# Patient Record
Sex: FEMALE | Race: WHITE | NOT HISPANIC OR LATINO | Employment: OTHER | URBAN - METROPOLITAN AREA
[De-identification: names, ages, dates, MRNs, and addresses within clinical notes are randomized per-mention and may not be internally consistent; named-entity substitution may affect disease eponyms.]

---

## 2023-03-24 ENCOUNTER — TELEPHONE (OUTPATIENT)
Dept: PRIMARY CARE | Facility: CLINIC | Age: 68
End: 2023-03-24
Payer: MEDICARE

## 2023-03-24 DIAGNOSIS — F32.A DEPRESSION, UNSPECIFIED DEPRESSION TYPE: ICD-10-CM

## 2023-03-24 NOTE — TELEPHONE ENCOUNTER
Refill lexapro  [facility]  Pt is out of med  Please send today

## 2023-03-27 PROBLEM — F32.A DEPRESSION: Status: ACTIVE | Noted: 2023-03-27

## 2023-03-27 RX ORDER — ESCITALOPRAM OXALATE 20 MG/1
1 TABLET ORAL DAILY
COMMUNITY
Start: 2021-02-12 | End: 2023-03-27 | Stop reason: SDUPTHER

## 2023-03-27 RX ORDER — ESCITALOPRAM OXALATE 20 MG/1
20 TABLET ORAL DAILY
Qty: 90 TABLET | Refills: 1 | Status: SHIPPED | OUTPATIENT
Start: 2023-03-27 | End: 2023-08-11 | Stop reason: SDUPTHER

## 2023-06-28 ENCOUNTER — TELEPHONE (OUTPATIENT)
Dept: PRIMARY CARE | Facility: CLINIC | Age: 68
End: 2023-06-28
Payer: MEDICARE

## 2023-06-28 DIAGNOSIS — J44.9 CHRONIC OBSTRUCTIVE PULMONARY DISEASE, UNSPECIFIED COPD TYPE (MULTI): ICD-10-CM

## 2023-06-29 RX ORDER — ALBUTEROL SULFATE 90 UG/1
AEROSOL, METERED RESPIRATORY (INHALATION)
COMMUNITY
Start: 2023-03-10 | End: 2023-06-29 | Stop reason: SDUPTHER

## 2023-06-29 RX ORDER — ALBUTEROL SULFATE 90 UG/1
AEROSOL, METERED RESPIRATORY (INHALATION)
Qty: 6.7 G | Refills: 0 | Status: SHIPPED | OUTPATIENT
Start: 2023-06-29 | End: 2023-08-11 | Stop reason: SDUPTHER

## 2023-08-10 ENCOUNTER — TELEPHONE (OUTPATIENT)
Dept: PRIMARY CARE | Facility: CLINIC | Age: 68
End: 2023-08-10

## 2023-08-10 NOTE — TELEPHONE ENCOUNTER
PATIENT IS COMING IN TOMORROW TO SEE  FOR SHOULDER PAIN.   SHE SEES DR MCKINNEY FOR OZEMPIC AND WAS TOLD THEY DIDN'T HAVE SAMPLES AND [place]. SHE IS ASKING IF WE HAVE SAMPLES AND IF DR WOULD LET HER HAVE ONE.  PLEASE ADVISE

## 2023-08-11 ENCOUNTER — OFFICE VISIT (OUTPATIENT)
Dept: PRIMARY CARE | Facility: CLINIC | Age: 68
End: 2023-08-11
Payer: MEDICARE

## 2023-08-11 VITALS
HEART RATE: 86 BPM | OXYGEN SATURATION: 94 % | BODY MASS INDEX: 33.38 KG/M2 | WEIGHT: 181.4 LBS | DIASTOLIC BLOOD PRESSURE: 78 MMHG | SYSTOLIC BLOOD PRESSURE: 140 MMHG | HEIGHT: 62 IN

## 2023-08-11 DIAGNOSIS — I15.9 SECONDARY HYPERTENSION: ICD-10-CM

## 2023-08-11 DIAGNOSIS — E78.5 HYPERLIPIDEMIA, UNSPECIFIED HYPERLIPIDEMIA TYPE: ICD-10-CM

## 2023-08-11 DIAGNOSIS — Z00.00 ROUTINE GENERAL MEDICAL EXAMINATION AT A HEALTH CARE FACILITY: ICD-10-CM

## 2023-08-11 DIAGNOSIS — K21.9 GASTROESOPHAGEAL REFLUX DISEASE, UNSPECIFIED WHETHER ESOPHAGITIS PRESENT: ICD-10-CM

## 2023-08-11 DIAGNOSIS — M25.511 ACUTE PAIN OF RIGHT SHOULDER: ICD-10-CM

## 2023-08-11 DIAGNOSIS — F32.A DEPRESSION, UNSPECIFIED DEPRESSION TYPE: ICD-10-CM

## 2023-08-11 DIAGNOSIS — J44.9 CHRONIC OBSTRUCTIVE PULMONARY DISEASE, UNSPECIFIED COPD TYPE (MULTI): ICD-10-CM

## 2023-08-11 DIAGNOSIS — M25.511 RIGHT SHOULDER PAIN, UNSPECIFIED CHRONICITY: ICD-10-CM

## 2023-08-11 PROCEDURE — G0442 ANNUAL ALCOHOL SCREEN 15 MIN: HCPCS | Performed by: EMERGENCY MEDICINE

## 2023-08-11 PROCEDURE — 3077F SYST BP >= 140 MM HG: CPT | Performed by: EMERGENCY MEDICINE

## 2023-08-11 PROCEDURE — 1159F MED LIST DOCD IN RCRD: CPT | Performed by: EMERGENCY MEDICINE

## 2023-08-11 PROCEDURE — G0439 PPPS, SUBSEQ VISIT: HCPCS | Performed by: EMERGENCY MEDICINE

## 2023-08-11 PROCEDURE — 3078F DIAST BP <80 MM HG: CPT | Performed by: EMERGENCY MEDICINE

## 2023-08-11 PROCEDURE — 99397 PER PM REEVAL EST PAT 65+ YR: CPT | Performed by: EMERGENCY MEDICINE

## 2023-08-11 PROCEDURE — 20610 DRAIN/INJ JOINT/BURSA W/O US: CPT | Performed by: EMERGENCY MEDICINE

## 2023-08-11 PROCEDURE — 99497 ADVNCD CARE PLAN 30 MIN: CPT | Performed by: EMERGENCY MEDICINE

## 2023-08-11 PROCEDURE — G0446 INTENS BEHAVE THER CARDIO DX: HCPCS | Performed by: EMERGENCY MEDICINE

## 2023-08-11 PROCEDURE — 99213 OFFICE O/P EST LOW 20 MIN: CPT | Performed by: EMERGENCY MEDICINE

## 2023-08-11 PROCEDURE — G0444 DEPRESSION SCREEN ANNUAL: HCPCS | Performed by: EMERGENCY MEDICINE

## 2023-08-11 PROCEDURE — 1170F FXNL STATUS ASSESSED: CPT | Performed by: EMERGENCY MEDICINE

## 2023-08-11 RX ORDER — DOXYCYCLINE 100 MG/1
CAPSULE ORAL EVERY 12 HOURS
COMMUNITY
Start: 2022-06-22

## 2023-08-11 RX ORDER — ROSUVASTATIN CALCIUM 40 MG/1
40 TABLET, COATED ORAL DAILY
Qty: 90 TABLET | Refills: 1 | Status: SHIPPED | OUTPATIENT
Start: 2023-08-11

## 2023-08-11 RX ORDER — AMITRIPTYLINE HYDROCHLORIDE 25 MG/1
25 TABLET, FILM COATED ORAL NIGHTLY
COMMUNITY
End: 2023-08-11 | Stop reason: SDUPTHER

## 2023-08-11 RX ORDER — METFORMIN HYDROCHLORIDE 500 MG/1
500 TABLET, EXTENDED RELEASE ORAL
COMMUNITY
Start: 2023-04-02

## 2023-08-11 RX ORDER — PANTOPRAZOLE SODIUM 40 MG/1
TABLET, DELAYED RELEASE ORAL
COMMUNITY
End: 2023-08-11 | Stop reason: SDUPTHER

## 2023-08-11 RX ORDER — ASPIRIN 325 MG
TABLET ORAL
COMMUNITY

## 2023-08-11 RX ORDER — LISINOPRIL 20 MG/1
20 TABLET ORAL EVERY EVENING
COMMUNITY
End: 2023-08-11 | Stop reason: SDUPTHER

## 2023-08-11 RX ORDER — BUSPIRONE HYDROCHLORIDE 10 MG/1
1 TABLET ORAL 3 TIMES DAILY
COMMUNITY
Start: 2021-02-12 | End: 2024-02-13 | Stop reason: SDUPTHER

## 2023-08-11 RX ORDER — LISINOPRIL AND HYDROCHLOROTHIAZIDE 10; 12.5 MG/1; MG/1
1 TABLET ORAL DAILY
Qty: 90 TABLET | Refills: 1 | Status: SHIPPED | OUTPATIENT
Start: 2023-08-11 | End: 2024-06-05 | Stop reason: SDUPTHER

## 2023-08-11 RX ORDER — PANTOPRAZOLE SODIUM 40 MG/1
40 TABLET, DELAYED RELEASE ORAL 2 TIMES DAILY
Qty: 180 TABLET | Refills: 1 | Status: SHIPPED | OUTPATIENT
Start: 2023-08-11 | End: 2024-06-05 | Stop reason: SDUPTHER

## 2023-08-11 RX ORDER — NITROGLYCERIN 0.4 MG/1
TABLET SUBLINGUAL
COMMUNITY
Start: 2021-02-12 | End: 2024-04-23 | Stop reason: SDUPTHER

## 2023-08-11 RX ORDER — DULAGLUTIDE 0.75 MG/.5ML
INJECTION, SOLUTION SUBCUTANEOUS
COMMUNITY
Start: 2021-06-30

## 2023-08-11 RX ORDER — LISINOPRIL AND HYDROCHLOROTHIAZIDE 10; 12.5 MG/1; MG/1
1 TABLET ORAL DAILY
COMMUNITY
End: 2023-08-11 | Stop reason: SDUPTHER

## 2023-08-11 RX ORDER — BUDESONIDE AND FORMOTEROL FUMARATE DIHYDRATE 160; 4.5 UG/1; UG/1
2 AEROSOL RESPIRATORY (INHALATION) 2 TIMES DAILY
COMMUNITY
Start: 2020-03-31

## 2023-08-11 RX ORDER — CLINDAMYCIN HYDROCHLORIDE 300 MG/1
CAPSULE ORAL
COMMUNITY

## 2023-08-11 RX ORDER — ESCITALOPRAM OXALATE 20 MG/1
20 TABLET ORAL DAILY
Qty: 90 TABLET | Refills: 1 | Status: SHIPPED | OUTPATIENT
Start: 2023-08-11 | End: 2023-12-05 | Stop reason: SDUPTHER

## 2023-08-11 RX ORDER — MECLIZINE HYDROCHLORIDE 25 MG/1
1 TABLET ORAL 3 TIMES DAILY PRN
COMMUNITY
Start: 2019-10-02 | End: 2024-05-30 | Stop reason: SDUPTHER

## 2023-08-11 RX ORDER — CYCLOBENZAPRINE HCL 10 MG
1 TABLET ORAL NIGHTLY
COMMUNITY
Start: 2022-06-22

## 2023-08-11 RX ORDER — ALBUTEROL SULFATE 90 UG/1
AEROSOL, METERED RESPIRATORY (INHALATION)
Qty: 6.7 G | Refills: 1 | Status: SHIPPED | OUTPATIENT
Start: 2023-08-11 | End: 2024-05-15 | Stop reason: SDUPTHER

## 2023-08-11 RX ORDER — EZETIMIBE 10 MG/1
1 TABLET ORAL DAILY
COMMUNITY
Start: 2022-06-15

## 2023-08-11 RX ORDER — AMITRIPTYLINE HYDROCHLORIDE 25 MG/1
25 TABLET, FILM COATED ORAL NIGHTLY
Qty: 90 TABLET | Refills: 1 | Status: SHIPPED | OUTPATIENT
Start: 2023-08-11 | End: 2024-02-13 | Stop reason: SDUPTHER

## 2023-08-11 RX ORDER — LISINOPRIL 20 MG/1
20 TABLET ORAL EVERY EVENING
Qty: 90 TABLET | Refills: 1 | Status: SHIPPED | OUTPATIENT
Start: 2023-08-11 | End: 2024-02-05

## 2023-08-11 RX ORDER — ROSUVASTATIN CALCIUM 40 MG/1
40 TABLET, COATED ORAL DAILY
COMMUNITY
End: 2023-08-11 | Stop reason: SDUPTHER

## 2023-08-11 RX ORDER — METOCLOPRAMIDE 10 MG/1
1 TABLET ORAL 3 TIMES DAILY
COMMUNITY
Start: 2021-07-21

## 2023-08-11 RX ORDER — BLOOD SUGAR DIAGNOSTIC
STRIP MISCELLANEOUS
COMMUNITY
Start: 2014-04-15

## 2023-08-11 RX ADMIN — TRIAMCINOLONE ACETONIDE 40 MG: 40 INJECTION, SUSPENSION INTRA-ARTICULAR; INTRAMUSCULAR at 09:32

## 2023-08-11 ASSESSMENT — ENCOUNTER SYMPTOMS
OCCASIONAL FEELINGS OF UNSTEADINESS: 0
DEPRESSION: 0
LOSS OF SENSATION IN FEET: 0

## 2023-08-11 ASSESSMENT — PATIENT HEALTH QUESTIONNAIRE - PHQ9
1. LITTLE INTEREST OR PLEASURE IN DOING THINGS: NOT AT ALL
SUM OF ALL RESPONSES TO PHQ9 QUESTIONS 1 AND 2: 0
2. FEELING DOWN, DEPRESSED OR HOPELESS: NOT AT ALL

## 2023-08-11 ASSESSMENT — ACTIVITIES OF DAILY LIVING (ADL)
DRESSING: INDEPENDENT
BATHING: INDEPENDENT

## 2023-08-11 NOTE — PROGRESS NOTES
Diagnoses/Problems     · Acute sinusitis (461.9) (J01.90)   · Otitis media (382.9) (H66.90)   · Right ear pain (388.70) (H92.01)   · Vertigo (780.4) (R42)   · Hypertension (401.9) (I10)   · Hyperlipidemia (272.4) (E78.5)     Orders  Otitis media    · Start: Doxycycline Hyclate 100 MG Oral Capsule; TAKE 1 CAPSULE EVERY 12 HOURS  UNTIL GONE  Right ear pain    · Start: Cyclobenzaprine HCl - 10 MG Oral Tablet; TAKE 1 TABLET Bedtime     Provider Impressions     68-year-old lady for Medicare wellness, physical and office visit     Right shoulder pain-under aseptic precaution, 2 cc of Kenalog was injected into the right shoulder.  No complications     anxiety and depression-cont increased dose of Lexapro. She is already on BuSpar. Declined to see a counselor or therapist or psychiatrist. Not suicidal or homicidal     Vertigo- cont meclizine QHS     COPD -currently controlled     Hypertension- cont meds     Diabetes-on invokana,follows with endocrinology. Hemoglobin A1c has been high because of noncompliance     GERD-pantoprazole     Hyperlipidemia-on Crestor     Preventive care  Had Prevnar 13 in 2018  Will get Pneumovax 23 this year  She was counseled about taking a shingles shot from an outside pharmacy  Follows up with GYN for Pap smear and mammogram  Follows up with GI regularly for colonoscopies     I discussed advanced care planning including the explanation and discussion of advanced directives. If patient does not have current up to date documents, examples and information provided on how to create both living will and power of . Patient was encouraged to work on completing these documents.  Information and advise was also provided on DO NOT RESUSCITATE and patient encouraged to consider this  Patient is not sure about DNR at this time    PH Q-9 depression screening was completed by authorized employee of the practice and  explained the questionnaire and discussed the answers with the patient.    I screened  for alcohol use    We had face-to-face discussion with this patient about the cardiovascular risk and behavioral therapies of nutritional choices, exercise and elimination of habits contradicting to the risk. We agreed on how they may be able to reduce their current cardiovascular risk.       Follow-up in 3 months or as needed      Chief Complaint     Pt f/u for continued neck and jaw pain      History of Present IllnessThis is a 66-year-old lady for office visit. f/u for neck and jaw pain s/p visit 1wk ago     Has continued right-sided facial, ear pain, no sore throat  No other associated sx                 Review of Systems  All eleven systems were reviewed with the patient and were negative for any symptoms other than what is documented in the history of present illness.        Active Problems     · Abdominal pain (789.00) (R10.9)   · Abnormal weight loss (783.21) (R63.4)   · Accidental fall (E888.9) (W19.XXXA)   · Acid reflux (530.81) (K21.9)   · Acute sinusitis (461.9) (J01.90)   · Aneurysm (442.9) (I72.9)   · Atypical chest pain (786.59) (R07.89)   · Brain lesion (348.89) (G93.9)   · CAD (coronary artery disease) (414.00) (I25.10)   · Candidal stomatitis (112.0) (B37.0)   · Chest pain (786.50) (R07.9)   · Contusion of left leg (924.5) (S80.12XA)   · COPD (chronic obstructive pulmonary disease) (496) (J44.9)   · Cough (786.2) (R05.9)   · Depression (311) (F32.A)   · Diabetes mellitus (250.00) (E11.9)   · Dysphagia, pharyngeal (787.23) (R13.13)   · 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) (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)   · Encounter by telehealth for suspected COVID-19 (V01.79) (Z20.822)   · Encounter for screening laboratory testing for COVID-19 virus (V01.79) (Z20.822)   · Eustachian tube dysfunction (381.81) (H69.80)   · External hemorrhoids (455.3) (K64.4)   · External otitis of left ear (380.10) (H60.92)   · Fever (780.60) (R50.9)   · Greater trochanteric bursitis of left hip (726.5) (M70.62)   · Hyperlipidemia (272.4) (E78.5)   ·  Hypertension (401.9) (I10)   · Incontinence (788.30) (R32)   · Otitis media (382.9) (H66.90)   · Paronychia, finger (681.02) (L03.019)   · Polydipsia (783.5) (R63.1)   · Polyuria (788.42) (R35.89)   · Prepatellar bursitis of right knee (726.65) (M70.41)   · Right ear pain (388.70) (H92.01)   · Screening for colon cancer (V76.51) (Z12.11)   · Seasonal allergies (477.9) (J30.2)   · Sinusitis (473.9) (J32.9)   · Suspected COVID-19 virus infection (V01.79) (Z20.822)   · Tubular adenoma of colon (211.3) (D12.6)   · Type 2 diabetes mellitus, uncontrolled (250.02)   · Vertigo (780.4) (R42)   · Visual disturbance (368.9) (H53.9)   · Vitamin B12 deficiency (266.2) (E53.8)     Past Medical History     · History of Abnormal MRI (793.99) (R93.89)   · Resolved Date: 06 Sep 2019   · History of constipation (V12.79) (Z87.19)   · History of diarrhea (V12.79) (Z87.898)     Surgical History     · History of Cholecystectomy   · History of Hysterectomy   · History of Tonsillectomy     Social History     · Being A Social Drinker   · Current smoker (305.1) (F17.200)   · Currently working   · Daily caffeine consumption, 2-3 servings a day   · Denied: History of Drug Use   · Former smoker (V15.82) (Z87.891)   ·      Allergies     · Paxil   Rash; Updated By: Sisi Mckeon; 11/24/2014 4:11:33 PM   · Sulfa Drugs   Rash; Updated By: Sisi Mckeon; 11/24/2014 4:11:33 PM   · Penicillins   Recorded By: Les Millard; 1/25/2018 1:19:12 PM     Current Meds     Medication Name Instruction   Amitriptyline HCl - 25 MG Oral Tablet TAKE 1 TABLET AT BEDTIME.   Aspirin 325 MG Oral Tablet     busPIRone HCl - 10 MG Oral Tablet TAKE 1 TABLET 3 TIMES DAILY.   Clindamycin HCl - 300 MG Oral Capsule TAKE 1 CAPSULE 3 times daily   Escitalopram Oxalate 20 MG Oral Tablet TAKE 1 TABLET DAILY.   Ezetimibe 10 MG Oral Tablet TAKE ONE TABLET BY MOUTH EVERY DAY   Jardiance 25 MG Oral Tablet TAKE 1 TABLET BY MOUTH ONCE DAILY   Lisinopril 20 MG  Oral Tablet take one in the evening   Lisinopril-hydroCHLOROthiazide 10-12.5 MG Oral Tablet TAKE 1 TABLET DAILY.   Meclizine HCl - 25 MG Oral Tablet TAKE 1 TABLET 3 TIMES DAILY AS NEEDED.   metFORMIN HCl - 500 MG Oral Tablet TAKE ONE TABLET BY MOUTH TWO TIMES A DAY   Metoclopramide HCl - 10 MG Oral Tablet TAKE 1 TABLET 3 times daily   Neomycin-Polymyxin-HC 1 % Otic Solution INSTILL 3 DROPS IN AFFECTED EAR(S) 3-4 TIMES DAILY.   Nitroglycerin 0.4 MG Sublingual Tablet Sublingual PLACE 1 TABLET UNDER THE TONGUE EVERY 5 MINUTES UP TO 3 DOSES AS NEEDED FOR CHEST PAIN.   OneTouch Ultra Blue STRP TEST 2 TIMES DAILY.   Pantoprazole Sodium 40 MG Oral Tablet Delayed Release TAKE 1 TABLET BY MOUTH TWICE DAILY 30 MINUTES PRIOR TO BREAKFAST AND DINNER   Rosuvastatin Calcium 40 MG Oral Tablet Take 1 tablet daily   Symbicort 160-4.5 MCG/ACT Inhalation Aerosol INHALE 2 PUFFS TWICE DAILY. RINSE MOUTH AFTER USE.   Trulicity 0.75 MG/0.5ML Subcutaneous Solution Pen-injector inject 0.5ml (0.75mg) subcutaneously weekly   Vitamin D (Ergocalciferol) 1.25 MG (86234 UT) Oral Capsule TAKE 1 CAPSULE WEEKLY.      Vitals  Vital Signs     Recorded: 22Jun2022 01:59PM   Heart Rate 85   Systolic 182   Diastolic 82   Height 5 ft 2 in   Weight 199 lb    BMI Calculated 36.4 kg/m2   BSA Calculated 1.91   Tobacco Use b) No   Falls Screening (Age 18+) a) No falls within the last year   O2 Saturation 96      Physical Exam     Vital signs as per nursing/MA documentation  General appearance: Alert and in no acute distress  HEENT: Normal Inspection  Neck - Normal Inspectiopn  Respiratory : No respiratory distress. Lungs are clear   Cardiovascular: heart rate normal. No gallop  Back - normal inspection  Skin inspection:Warm  Musculoskeletal : No deformities  Neuro : Limited exam. Baseline  Psychiatric : Cooperative

## 2023-08-16 RX ORDER — TRIAMCINOLONE ACETONIDE 40 MG/ML
40 INJECTION, SUSPENSION INTRA-ARTICULAR; INTRAMUSCULAR ONCE
Status: COMPLETED | OUTPATIENT
Start: 2023-08-16 | End: 2023-08-11

## 2023-10-20 ENCOUNTER — TELEMEDICINE (OUTPATIENT)
Dept: PRIMARY CARE | Facility: CLINIC | Age: 68
End: 2023-10-20
Payer: MEDICARE

## 2023-10-20 VITALS — WEIGHT: 170 LBS | HEIGHT: 62 IN | BODY MASS INDEX: 31.28 KG/M2

## 2023-10-20 DIAGNOSIS — U07.1 COVID: Primary | ICD-10-CM

## 2023-10-20 PROCEDURE — 99213 OFFICE O/P EST LOW 20 MIN: CPT | Performed by: EMERGENCY MEDICINE

## 2023-10-20 RX ORDER — NIRMATRELVIR AND RITONAVIR 300-100 MG
KIT ORAL
Qty: 30 TABLET | Refills: 0 | Status: SHIPPED | OUTPATIENT
Start: 2023-10-20 | End: 2023-10-25

## 2023-10-20 NOTE — PROGRESS NOTES
Diagnoses/Problems     · Acute sinusitis (461.9) (J01.90)   · Otitis media (382.9) (H66.90)   · Right ear pain (388.70) (H92.01)   · Vertigo (780.4) (R42)   · Hypertension (401.9) (I10)   · Hyperlipidemia (272.4) (E78.5)     Orders  Otitis media    · Start: Doxycycline Hyclate 100 MG Oral Capsule; TAKE 1 CAPSULE EVERY 12 HOURS  UNTIL GONE  Right ear pain    · Start: Cyclobenzaprine HCl - 10 MG Oral Tablet; TAKE 1 TABLET Bedtime     Provider Impressions     68-year-old lady for follow-up    COVID-positive-Paxlovid    anxiety and depression-cont increased dose of Lexapro. She is already on BuSpar. Declined to see a counselor or therapist or psychiatrist. Not suicidal or homicidal     Vertigo- cont meclizine QHS     COPD -currently controlled     Hypertension- cont meds     Diabetes-on invokana,follows with endocrinology. Hemoglobin A1c has been high because of noncompliance     GERD-pantoprazole     Hyperlipidemia-on Crestor     Preventive care  Had Prevnar 13 in 2018  Will get Pneumovax 23 this year  She was counseled about taking a shingles shot from an outside pharmacy  Follows up with GYN for Pap smear and mammogram  Follows up with GI regularly for colonoscopies        Follow-up in 3 months or as needed      Chief Complaint      This visit was completed virtually due to the restrictions of the COVID-19 pandemic. All issues as below were discussed and addressed but no physical exam was performed. If it was felt that the patient should be evaluated in clinic or ER, then they were directed there. The patient verbally consented to visit       History of Present Illness    68-year-old lady for runny nose, coughing, sore throat, body aches  States that her COVID test was positive.  Her  is also COVID-positive      Review of Systems  All eleven systems were reviewed with the patient and were negative for any symptoms other than what is documented in the history of present illness.        Active Problems     ·  Abdominal pain (789.00) (R10.9)   · Abnormal weight loss (783.21) (R63.4)   · Accidental fall (E888.9) (W19.XXXA)   · Acid reflux (530.81) (K21.9)   · Acute sinusitis (461.9) (J01.90)   · Aneurysm (442.9) (I72.9)   · Atypical chest pain (786.59) (R07.89)   · Brain lesion (348.89) (G93.9)   · CAD (coronary artery disease) (414.00) (I25.10)   · Candidal stomatitis (112.0) (B37.0)   · Chest pain (786.50) (R07.9)   · Contusion of left leg (924.5) (S80.12XA)   · COPD (chronic obstructive pulmonary disease) (496) (J44.9)   · Cough (786.2) (R05.9)   · Depression (311) (F32.A)   · Diabetes mellitus (250.00) (E11.9)   · Dysphagia, pharyngeal (787.23) (R13.13)   · 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) (H60.543)   · Encounter by telehealth for suspected COVID-19 (V01.79) (Z20.822)   · Encounter for screening laboratory testing for COVID-19 virus (V01.79) (Z20.822)   · Eustachian tube dysfunction (381.81) (H69.80)   · External hemorrhoids (455.3) (K64.4)   · External otitis of left ear (380.10) (H60.92)   · Fever (780.60) (R50.9)   · Greater trochanteric bursitis of left hip (726.5) (M70.62)   · Hyperlipidemia (272.4) (E78.5)   · Hypertension (401.9) (I10)   · Incontinence (788.30) (R32)   · Otitis media (382.9) (H66.90)   · Paronychia, finger (681.02) (L03.019)   · Polydipsia (783.5) (R63.1)   · Polyuria (788.42) (R35.89)   · Prepatellar bursitis of right knee (726.65) (M70.41)   · Right ear pain (388.70) (H92.01)   · Screening for colon cancer (V76.51) (Z12.11)   · Seasonal allergies (477.9) (J30.2)   · Sinusitis (473.9) (J32.9)   · Suspected COVID-19 virus infection (V01.79) (Z20.822)   · Tubular adenoma of colon (211.3) (D12.6)   · Type 2 diabetes mellitus, uncontrolled (250.02)   · Vertigo (780.4) (R42)   · Visual disturbance (368.9) (H53.9)   · Vitamin B12 deficiency (266.2) (E53.8)     Past Medical History     · History of Abnormal MRI (793.99) (R93.89)   · Resolved Date: 06 Sep 2019   · History of constipation (V12.79)  (Z87.19)   · History of diarrhea (V12.79) (Z87.898)     Surgical History     · History of Cholecystectomy   · History of Hysterectomy   · History of Tonsillectomy     Social History     · Being A Social Drinker   · Current smoker (305.1) (F17.200)   · Currently working   · Daily caffeine consumption, 2-3 servings a day   · Denied: History of Drug Use   · Former smoker (V15.82) (Z87.891)   ·      Allergies     · Paxil   Rash; Updated By: Sisi Mckeon; 11/24/2014 4:11:33 PM   · Sulfa Drugs   Rash; Updated By: Sisi Mckeon; 11/24/2014 4:11:33 PM   · Penicillins   Recorded By: Les Millard; 1/25/2018 1:19:12 PM     Current Meds     Medication Name Instruction   Amitriptyline HCl - 25 MG Oral Tablet TAKE 1 TABLET AT BEDTIME.   Aspirin 325 MG Oral Tablet     busPIRone HCl - 10 MG Oral Tablet TAKE 1 TABLET 3 TIMES DAILY.   Clindamycin HCl - 300 MG Oral Capsule TAKE 1 CAPSULE 3 times daily   Escitalopram Oxalate 20 MG Oral Tablet TAKE 1 TABLET DAILY.   Ezetimibe 10 MG Oral Tablet TAKE ONE TABLET BY MOUTH EVERY DAY   Jardiance 25 MG Oral Tablet TAKE 1 TABLET BY MOUTH ONCE DAILY   Lisinopril 20 MG Oral Tablet take one in the evening   Lisinopril-hydroCHLOROthiazide 10-12.5 MG Oral Tablet TAKE 1 TABLET DAILY.   Meclizine HCl - 25 MG Oral Tablet TAKE 1 TABLET 3 TIMES DAILY AS NEEDED.   metFORMIN HCl - 500 MG Oral Tablet TAKE ONE TABLET BY MOUTH TWO TIMES A DAY   Metoclopramide HCl - 10 MG Oral Tablet TAKE 1 TABLET 3 times daily   Neomycin-Polymyxin-HC 1 % Otic Solution INSTILL 3 DROPS IN AFFECTED EAR(S) 3-4 TIMES DAILY.   Nitroglycerin 0.4 MG Sublingual Tablet Sublingual PLACE 1 TABLET UNDER THE TONGUE EVERY 5 MINUTES UP TO 3 DOSES AS NEEDED FOR CHEST PAIN.   OneTouch Ultra Blue STRP TEST 2 TIMES DAILY.   Pantoprazole Sodium 40 MG Oral Tablet Delayed Release TAKE 1 TABLET BY MOUTH TWICE DAILY 30 MINUTES PRIOR TO BREAKFAST AND DINNER   Rosuvastatin Calcium 40 MG Oral Tablet Take 1 tablet daily    Symbicort 160-4.5 MCG/ACT Inhalation Aerosol INHALE 2 PUFFS TWICE DAILY. RINSE MOUTH AFTER USE.   Trulicity 0.75 MG/0.5ML Subcutaneous Solution Pen-injector inject 0.5ml (0.75mg) subcutaneously weekly   Vitamin D (Ergocalciferol) 1.25 MG (70829 UT) Oral Capsule TAKE 1 CAPSULE WEEKLY.      Vitals  Vital Signs     Recorded: 22Jun2022 01:59PM   Heart Rate 85   Systolic 182   Diastolic 82   Height 5 ft 2 in   Weight 199 lb    BMI Calculated 36.4 kg/m2   BSA Calculated 1.91   Tobacco Use b) No   Falls Screening (Age 18+) a) No falls within the last year   O2 Saturation 96      Physical Exam     Vital signs as per nursing/MA documentation  General appearance: Alert and in no acute distress  HEENT: Normal Inspection  Neck - Normal Inspectiopn  Respiratory : No respiratory distress. Lungs are clear   Cardiovascular: heart rate normal. No gallop  Back - normal inspection  Skin inspection:Warm  Musculoskeletal : No deformities  Neuro : Limited exam. Baseline  Psychiatric : Cooperative

## 2023-11-13 ENCOUNTER — APPOINTMENT (OUTPATIENT)
Dept: PRIMARY CARE | Facility: CLINIC | Age: 68
End: 2023-11-13
Payer: MEDICARE

## 2023-12-04 ENCOUNTER — TELEPHONE (OUTPATIENT)
Dept: PRIMARY CARE | Facility: CLINIC | Age: 68
End: 2023-12-04
Payer: MEDICARE

## 2023-12-04 DIAGNOSIS — F32.A DEPRESSION, UNSPECIFIED DEPRESSION TYPE: ICD-10-CM

## 2023-12-05 RX ORDER — ESCITALOPRAM OXALATE 20 MG/1
20 TABLET ORAL DAILY
Qty: 90 TABLET | Refills: 1 | Status: SHIPPED | OUTPATIENT
Start: 2023-12-05 | End: 2024-12-04

## 2024-01-19 ENCOUNTER — TELEPHONE (OUTPATIENT)
Dept: PRIMARY CARE | Facility: CLINIC | Age: 69
End: 2024-01-19

## 2024-01-29 ENCOUNTER — OFFICE VISIT (OUTPATIENT)
Dept: PRIMARY CARE | Facility: CLINIC | Age: 69
End: 2024-01-29
Payer: MEDICARE

## 2024-01-29 VITALS
OXYGEN SATURATION: 97 % | BODY MASS INDEX: 31.28 KG/M2 | DIASTOLIC BLOOD PRESSURE: 62 MMHG | HEART RATE: 67 BPM | WEIGHT: 170 LBS | SYSTOLIC BLOOD PRESSURE: 150 MMHG | HEIGHT: 62 IN

## 2024-01-29 DIAGNOSIS — M25.519 SHOULDER PAIN, UNSPECIFIED CHRONICITY, UNSPECIFIED LATERALITY: ICD-10-CM

## 2024-01-29 DIAGNOSIS — M25.511 CHRONIC RIGHT SHOULDER PAIN: ICD-10-CM

## 2024-01-29 DIAGNOSIS — J44.9 CHRONIC OBSTRUCTIVE PULMONARY DISEASE, UNSPECIFIED COPD TYPE (MULTI): ICD-10-CM

## 2024-01-29 DIAGNOSIS — E11.9 TYPE 2 DIABETES MELLITUS WITHOUT COMPLICATION, UNSPECIFIED WHETHER LONG TERM INSULIN USE (MULTI): ICD-10-CM

## 2024-01-29 DIAGNOSIS — I10 PRIMARY HYPERTENSION: Primary | ICD-10-CM

## 2024-01-29 DIAGNOSIS — E78.2 MIXED HYPERLIPIDEMIA: ICD-10-CM

## 2024-01-29 DIAGNOSIS — G89.29 CHRONIC RIGHT SHOULDER PAIN: ICD-10-CM

## 2024-01-29 PROBLEM — R32 INCONTINENCE: Status: ACTIVE | Noted: 2024-01-29

## 2024-01-29 PROBLEM — E78.5 HYPERLIPIDEMIA: Status: ACTIVE | Noted: 2024-01-29

## 2024-01-29 PROBLEM — K21.9 GASTROESOPHAGEAL REFLUX DISEASE: Status: ACTIVE | Noted: 2024-01-29

## 2024-01-29 PROCEDURE — 4010F ACE/ARB THERAPY RXD/TAKEN: CPT | Performed by: EMERGENCY MEDICINE

## 2024-01-29 PROCEDURE — 96372 THER/PROPH/DIAG INJ SC/IM: CPT | Performed by: EMERGENCY MEDICINE

## 2024-01-29 PROCEDURE — 20610 DRAIN/INJ JOINT/BURSA W/O US: CPT | Performed by: EMERGENCY MEDICINE

## 2024-01-29 PROCEDURE — 3077F SYST BP >= 140 MM HG: CPT | Performed by: EMERGENCY MEDICINE

## 2024-01-29 PROCEDURE — 1159F MED LIST DOCD IN RCRD: CPT | Performed by: EMERGENCY MEDICINE

## 2024-01-29 PROCEDURE — 3078F DIAST BP <80 MM HG: CPT | Performed by: EMERGENCY MEDICINE

## 2024-01-29 PROCEDURE — 99213 OFFICE O/P EST LOW 20 MIN: CPT | Performed by: EMERGENCY MEDICINE

## 2024-01-29 RX ORDER — TRIAMCINOLONE ACETONIDE 40 MG/ML
40 INJECTION, SUSPENSION INTRA-ARTICULAR; INTRAMUSCULAR ONCE
Status: COMPLETED | OUTPATIENT
Start: 2024-01-29 | End: 2024-01-29

## 2024-01-29 RX ADMIN — TRIAMCINOLONE ACETONIDE 40 MG: 40 INJECTION, SUSPENSION INTRA-ARTICULAR; INTRAMUSCULAR at 16:41

## 2024-01-29 NOTE — PROGRESS NOTES
Subjective   Patient ID: Kimberly Bain is a 69 y.o. female who presents for Shoulder Pain.    Assessment/Plan   Problem List Items Addressed This Visit       Hypertension - Primary    COPD (chronic obstructive pulmonary disease) (CMS/HCC)    Diabetes mellitus (CMS/[facility])    Hyperlipidemia    Right shoulder pain     Right shoulder pain-under aseptic precaution, 2 cc of Kenalog was injected into the right shoulder.  Patient tolerated well. No complications      anxiety and depression-cont increased dose of Lexapro. She is already on BuSpar. Declined to see a counselor or therapist or psychiatrist. Not suicidal or homicidal     Vertigo- meclizine prn      COPD- currently controlled, continue steroid inhaler      Hypertension- stable, continue lisinopril      Diabetes-on invokana,follows with endocrinology. Hemoglobin A1c has been high because of noncompliance     GERD-pantoprazole     Hyperlipidemia-on Crestor     Preventive care  Had Prevnar 13 in 2018. Is due for Pneumovax 23   She was counseled about taking a shingles shot from an outside pharmacy  Follows up with GYN for Pap smear and mammogram  Follows up with GI regularly for colonoscopies    Follow up in 3 months or sooner as needed     Source of history: Nurse, Medical personnel, Medical record, Patient.  History limitation: None.    HPI  69 y.o. female here for follow up visit     Presents for chronic right shoulder pain.   Steroid injection 8/2023 with significant improvement. Reports pain-free for about 3 months with pain gradually increasing since.     Allergies   Allergen Reactions    Penicillins Other    Sulfa (Sulfonamide Antibiotics) Other       Current Outpatient Medications   Medication Sig Dispense Refill    albuterol 90 mcg/actuation inhaler inhale 1 to 2 puffs by mouth every 4 to 6 hours as needed 6.7 g 1    amitriptyline (Elavil) 25 mg tablet Take 1 tablet (25 mg) by mouth once daily at bedtime. 90 tablet 1    aspirin 325 mg tablet Take by  "mouth.      budesonide-formoteroL (Symbicort) 160-4.5 mcg/actuation inhaler Inhale 2 puffs 2 times a day.      busPIRone (Buspar) 10 mg tablet Take 1 tablet (10 mg) by mouth 3 times a day.      clindamycin (Cleocin) 300 mg capsule TAKE ONE CAPSULE BY MOUTH EVERY 6 HOURS UNTIL GONE      cyclobenzaprine (Flexeril) 10 mg tablet Take 1 tablet (10 mg) by mouth once daily at bedtime.      doxycycline (Vibramycin) 100 mg capsule Take by mouth every 12 hours.      dulaglutide (Trulicity) 0.75 mg/0.5 mL pen injector Inject under the skin.      empagliflozin (Jardiance) 25 mg Take 1 tablet (25 mg) by mouth once daily.      escitalopram (Lexapro) 20 mg tablet Take 1 tablet (20 mg) by mouth once daily. 90 tablet 1    ezetimibe (Zetia) 10 mg tablet Take 1 tablet (10 mg) by mouth once daily.      lisinopril 20 mg tablet Take 1 tablet (20 mg) by mouth once daily in the evening. 90 tablet 1    lisinopriL-hydrochlorothiazide 10-12.5 mg tablet Take 1 tablet by mouth once daily. 90 tablet 1    meclizine (Antivert) 25 mg tablet Take 1 tablet (25 mg) by mouth 3 times a day as needed.      metFORMIN XR (Glucophage-XR) 500 mg 24 hr tablet Take 1 tablet (500 mg) by mouth 2 times a day with meals.      metoclopramide (Reglan) 10 mg tablet Take 1 tablet (10 mg) by mouth 3 times a day.      nitroglycerin (Nitrostat) 0.4 mg SL tablet Place under the tongue.      OneTouch Ultra Test strip TEST 2 TIMES DAILY.      pantoprazole (ProtoNix) 40 mg EC tablet Take 1 tablet (40 mg) by mouth 2 times a day. Do not crush, chew, or split. 180 tablet 1    rosuvastatin (Crestor) 40 mg tablet Take 1 tablet (40 mg) by mouth once daily. 90 tablet 1     No current facility-administered medications for this visit.       Objective   Visit Vitals  /62   Pulse 67   Ht 1.575 m (5' 2\")   Wt 77.1 kg (170 lb)   SpO2 97%   BMI 31.09 kg/m²   Smoking Status Every Day   BSA 1.84 m²     Physical Exam  Vital signs as per nursing/MA documentation   General appearance: " Alert and in no acute distress  HEENT: Normal Inspection   Neck: Normal Inspection   Respiratory: No respiratory distress Lungs are clear   Cardiovascular: Heart rate normal. No gallop  Back: Normal Inspection   Skin inspection: Warm   Musculoskeletal: No deformities   Neuro: Limited exam. Baseline    Review of Systems   Comprehensive review of systems as allowed by patient condition and nursing input is negative    No visits with results within 4 Month(s) from this visit.   Latest known visit with results is:   Legacy Encounter on 05/11/2022   Component Date Value Ref Range Status    Pathology Report 05/11/2022    Final                    Value:Name EDGAR SPENCER                                                                                                   Accession #: [id number]            Pathologist:                   JOSSELYN PLEITEZ MD  Date of Procedure:    5/11/2022  Date Received:          5/11/2022  Date Reported           5/16/2022  Submitting Physician:   RANDI DONALDSON M.D.  Location:                    [facility]   Copy To/Referring/Attending:  SOL FALLON Other External #                                                                    FINAL DIAGNOSIS  A.  COLON, TRANSVERSE, POLYP X2, POLYPECTOMY:    -- MULTIPLE FRAGMENTS OF TUBULAR ADENOMA    B.  COLON, DESCENDING, POLYP X2, POLYPECTOMY:    -- TUBULAR ADENOMA (X2)    C.  RECTUM, POLYPECTOMY:    -- HYPERPLASTIC POLYP                                                                                                                                                                                                                                                                                                                                                                                                                                                                                                               Electronically Signed Out By JOSSELYN HASTINGS  "MD MAIK/[facility]  By the signature on this report, the individual or group listed as making the  Final Interpretation/Diagnosis certifies that they have reviewed this case.           Clinical History:  history of polyps      Specimens Submitted As:  A: TRANSVERSE COLON POLYP X2   B: DESCENDING COLON POLYP X2   C: RECTAL COLON POLYP     Gross Description:  A:  Received in formalin, labeled with the patient's name and hospital number  and \"A\", are multiple fragments of tan, soft tissue aggregating to 1.1 x 0.5 x  0.2 cm.  The specimen is submitted in toto in one cassette.  RCC    B:  Received in formalin, labeled with the patient's name and hospital number  and \"B\", are multiple fragments of tan, soft tissue aggregating to 1.5 x                           0.6 x  0.2 cm.  The specimen is submitted in toto in one cassette.  RCC    C:  Received in formalin, labeled with the patient's name and hospital number  and \"C\", are two fragments of tan, soft tissue aggregating to 0.4 x 0.3 x 0.2  cm.  The specimen is submitted in toto in one cassette.  RCC      rcc/5/14/2022               [facility]  Department of Pathology   [address] [place] [address]        CONVERTED FINAL DIAGNOSIS 05/11/2022    Final                    Value:A.  COLON, TRANSVERSE, POLYP X2, POLYPECTOMY:    -- MULTIPLE FRAGMENTS OF TUBULAR ADENOMA    B.  COLON, DESCENDING, POLYP X2, POLYPECTOMY:    -- TUBULAR ADENOMA (X2)    C.  RECTUM, POLYPECTOMY:    -- HYPERPLASTIC POLYP      CONVERTED CLINICAL DIAGNOSIS-HISTO* 05/11/2022    Final                    Value:history of polyps        CONVERTED GROSS DESCRIPTION 05/11/2022    Final                    Value:A:  Received in formalin, labeled with the patient's name and hospital number  and \"A\", are multiple fragments of tan, soft tissue aggregating to 1.1 x 0.5 x  0.2 cm.  The specimen is submitted in toto in one cassette.  RCC    B:  Received in formalin, labeled with " "the patient's name and hospital number  and \"B\", are multiple fragments of tan, soft tissue aggregating to 1.5 x 0.6 x  0.2 cm.  The specimen is submitted in toto in one cassette.  RCC    C:  Received in formalin, labeled with the patient's name and hospital number  and \"C\", are two fragments of tan, soft tissue aggregating to 0.4 x 0.3 x 0.2  cm.  The specimen is submitted in toto in one cassette.  RCC        CONVERTED FINAL REPORT PDF LINK TO* 05/11/2022 \\copathshare\copath\PDF 2022_Feb\[id number].pdf   Final       Radiology: Reviewed imaging in powerchart.  No results found.    No family history on file.  Social History     Socioeconomic History    Marital status:      Spouse name: None    Number of children: None    Years of education: None    Highest education level: None   Occupational History    None   Tobacco Use    Smoking status: Every Day     Packs/day: 1     Types: Cigarettes    Smokeless tobacco: Never   Substance and Sexual Activity    Alcohol use: Yes    Drug use: None    Sexual activity: None   Other Topics Concern    None   Social History Narrative    None     Social Determinants of Health     Financial Resource Strain: Not on file   Food Insecurity: Not on file   Transportation Needs: Not on file   Physical Activity: Not on file   Stress: Not on file   Social Connections: Not on file   Intimate Partner Violence: Not on file   Housing Stability: Not on file     Past Medical History:   Diagnosis Date    Abnormal findings on diagnostic imaging of other specified body structures 09/06/2019    Abnormal MRI    Personal history of other diseases of the digestive system     History of constipation    Personal history of other specified conditions     History of diarrhea     Past Surgical History:   Procedure Laterality Date    CHOLECYSTECTOMY  08/30/2013    Cholecystectomy    CT HEAD ANGIO W AND WO IV CONTRAST  12/8/2021    CT HEAD ANGIO W AND WO IV CONTRAST 12/8/2021 [facility]    " HYSTERECTOMY  08/30/2013    Hysterectomy    OTHER SURGICAL HISTORY  03/05/2019    Tonsillectomy       Scribe Attestation  By signing my name below, I, Eleno Zuniga   attest that this documentation has been prepared under the direction and in the presence of Stanley Curiel MD.

## 2024-02-03 DIAGNOSIS — I15.9 SECONDARY HYPERTENSION: ICD-10-CM

## 2024-02-05 RX ORDER — LISINOPRIL 20 MG/1
TABLET ORAL
Qty: 90 TABLET | Refills: 1 | Status: SHIPPED | OUTPATIENT
Start: 2024-02-05 | End: 2024-06-06 | Stop reason: SDUPTHER

## 2024-02-13 DIAGNOSIS — Z00.00 ROUTINE GENERAL MEDICAL EXAMINATION AT A HEALTH CARE FACILITY: ICD-10-CM

## 2024-02-13 DIAGNOSIS — F32.A DEPRESSION, UNSPECIFIED DEPRESSION TYPE: ICD-10-CM

## 2024-02-13 RX ORDER — AMITRIPTYLINE HYDROCHLORIDE 25 MG/1
25 TABLET, FILM COATED ORAL NIGHTLY
Qty: 90 TABLET | Refills: 1 | Status: SHIPPED | OUTPATIENT
Start: 2024-02-13

## 2024-02-13 RX ORDER — BUSPIRONE HYDROCHLORIDE 10 MG/1
10 TABLET ORAL 3 TIMES DAILY
Qty: 90 TABLET | Refills: 3 | Status: SHIPPED | OUTPATIENT
Start: 2024-02-13

## 2024-04-12 ENCOUNTER — APPOINTMENT (OUTPATIENT)
Dept: PRIMARY CARE | Facility: CLINIC | Age: 69
End: 2024-04-12
Payer: MEDICARE

## 2024-04-17 ENCOUNTER — APPOINTMENT (OUTPATIENT)
Dept: PRIMARY CARE | Facility: CLINIC | Age: 69
End: 2024-04-17
Payer: MEDICARE

## 2024-04-22 ENCOUNTER — OFFICE VISIT (OUTPATIENT)
Dept: PRIMARY CARE | Facility: CLINIC | Age: 69
End: 2024-04-22
Payer: MEDICARE

## 2024-04-22 ENCOUNTER — HOSPITAL ENCOUNTER (OUTPATIENT)
Dept: RADIOLOGY | Facility: EXTERNAL LOCATION | Age: 69
Discharge: HOME | End: 2024-04-22

## 2024-04-22 VITALS
DIASTOLIC BLOOD PRESSURE: 81 MMHG | HEART RATE: 81 BPM | WEIGHT: 174.2 LBS | HEIGHT: 63 IN | OXYGEN SATURATION: 96 % | BODY MASS INDEX: 30.87 KG/M2 | SYSTOLIC BLOOD PRESSURE: 191 MMHG

## 2024-04-22 DIAGNOSIS — E13.69 OTHER SPECIFIED DIABETES MELLITUS WITH OTHER SPECIFIED COMPLICATION, UNSPECIFIED WHETHER LONG TERM INSULIN USE (MULTI): ICD-10-CM

## 2024-04-22 DIAGNOSIS — Z12.31 ENCOUNTER FOR SCREENING MAMMOGRAM FOR MALIGNANT NEOPLASM OF BREAST: ICD-10-CM

## 2024-04-22 DIAGNOSIS — I10 PRIMARY HYPERTENSION: Primary | ICD-10-CM

## 2024-04-22 PROCEDURE — 3077F SYST BP >= 140 MM HG: CPT | Performed by: EMERGENCY MEDICINE

## 2024-04-22 PROCEDURE — 1159F MED LIST DOCD IN RCRD: CPT | Performed by: EMERGENCY MEDICINE

## 2024-04-22 PROCEDURE — 4004F PT TOBACCO SCREEN RCVD TLK: CPT | Performed by: EMERGENCY MEDICINE

## 2024-04-22 PROCEDURE — 4010F ACE/ARB THERAPY RXD/TAKEN: CPT | Performed by: EMERGENCY MEDICINE

## 2024-04-22 PROCEDURE — 99214 OFFICE O/P EST MOD 30 MIN: CPT | Performed by: EMERGENCY MEDICINE

## 2024-04-22 PROCEDURE — 3079F DIAST BP 80-89 MM HG: CPT | Performed by: EMERGENCY MEDICINE

## 2024-04-22 RX ORDER — METOPROLOL TARTRATE 25 MG/1
25 TABLET, FILM COATED ORAL 2 TIMES DAILY
Qty: 60 TABLET | Refills: 2 | Status: SHIPPED | OUTPATIENT
Start: 2024-04-22 | End: 2024-10-19

## 2024-04-22 NOTE — PROGRESS NOTES
Subjective   Patient ID: Kimberly Bain is a 69 y.o. female who presents for Hypertension.    Assessment/Plan   Problem List Items Addressed This Visit       Hypertension - Primary    Relevant Medications    metoprolol tartrate (Lopressor) 25 mg tablet    Diabetes mellitus (Multi)     Other Visit Diagnoses       Encounter for screening mammogram for malignant neoplasm of breast        Relevant Orders    BI mammo bilateral screening tomosynthesis (Completed)          Hypertension- BP elevated, will add metoprolol 25 mg BID. Continue lisinopril 20 mg and monitor     Right shoulder pain- improved with steroid injection      anxiety and depression-cont increased dose of Lexapro. She is already on BuSpar. Declined to see a counselor or therapist or psychiatrist. Not suicidal or homicidal     Vertigo- meclizine prn      COPD- currently controlled, continue steroid inhaler      Diabetes- follows with endocrinology. Significant progress with ozempic, last A1c 5.3      GERD-pantoprazole     Hyperlipidemia-on Crestor     Preventive care  Had Prevnar 13 in 2018. Is due for Pneumovax 23   She was counseled about taking a shingles shot from an outside pharmacy  Follows up with GYN for Pap smear and mammogram  Follows up with GI regularly for colonoscopies     Follow up in 3 months or sooner as needed     Source of history: Nurse, Medical personnel, Medical record, Patient.  History limitation: None.    HPI  69 y.o. female here for follow up visit     Elevated BP while getting dental work done recently. Since has been monitoring at home.   Reports generally in 140-160s/70-90s range, sometimes up into 190s.     Allergies   Allergen Reactions    Penicillins Other    Sulfa (Sulfonamide Antibiotics) Other       Current Outpatient Medications   Medication Sig Dispense Refill    amitriptyline (Elavil) 25 mg tablet Take 1 tablet (25 mg) by mouth once daily at bedtime. 90 tablet 1    busPIRone (Buspar) 10 mg tablet Take 1 tablet (10  "mg) by mouth 3 times a day. 90 tablet 3    escitalopram (Lexapro) 20 mg tablet Take 1 tablet (20 mg) by mouth once daily. 90 tablet 1    ezetimibe (Zetia) 10 mg tablet Take 1 tablet (10 mg) by mouth once daily.      lisinopril 20 mg tablet TAKE ONE TABLET (20 MG) BY MOUTH ONCE DAILY IN THE EVENING. 90 tablet 1    lisinopriL-hydrochlorothiazide 10-12.5 mg tablet Take 1 tablet by mouth once daily. 90 tablet 1    meclizine (Antivert) 25 mg tablet Take 1 tablet (25 mg) by mouth 3 times a day as needed.      metFORMIN XR (Glucophage-XR) 500 mg 24 hr tablet Take 1 tablet (500 mg) by mouth 2 times a day with meals.      nitroglycerin (Nitrostat) 0.4 mg SL tablet Place under the tongue.      OneTouch Ultra Test strip TEST 2 TIMES DAILY.      pantoprazole (ProtoNix) 40 mg EC tablet Take 1 tablet (40 mg) by mouth 2 times a day. Do not crush, chew, or split. 180 tablet 1    rosuvastatin (Crestor) 40 mg tablet Take 1 tablet (40 mg) by mouth once daily. 90 tablet 1    albuterol 90 mcg/actuation inhaler inhale 1 to 2 puffs by mouth every 4 to 6 hours as needed (Patient not taking: Reported on 4/22/2024) 6.7 g 1    aspirin 325 mg tablet Take by mouth.      budesonide-formoteroL (Symbicort) 160-4.5 mcg/actuation inhaler Inhale 2 puffs 2 times a day.      clindamycin (Cleocin) 300 mg capsule TAKE ONE CAPSULE BY MOUTH EVERY 6 HOURS UNTIL GONE      cyclobenzaprine (Flexeril) 10 mg tablet Take 1 tablet (10 mg) by mouth once daily at bedtime.      doxycycline (Vibramycin) 100 mg capsule Take by mouth every 12 hours.      dulaglutide (Trulicity) 0.75 mg/0.5 mL pen injector Inject under the skin.      metoclopramide (Reglan) 10 mg tablet Take 1 tablet (10 mg) by mouth 3 times a day.      metoprolol tartrate (Lopressor) 25 mg tablet Take 1 tablet (25 mg) by mouth 2 times a day. 60 tablet 2     No current facility-administered medications for this visit.       Objective   Visit Vitals  BP (!) 191/81   Pulse 81   Ht 1.588 m (5' 2.5\")   Wt 79 " kg (174 lb 3.2 oz)   SpO2 96%   BMI 31.35 kg/m²   Smoking Status Every Day   BSA 1.87 m²     Physical Exam  Vital signs as per nursing/MA documentation   General appearance: Alert and in no acute distress  HEENT: Normal Inspection   Neck: Normal Inspection   Respiratory: No respiratory distress Lungs are clear   Cardiovascular: Heart rate normal. No gallop  Back: Normal Inspection   Skin inspection: Warm   Musculoskeletal: No deformities   Neuro: Limited exam. Baseline    Review of Systems   Comprehensive review of systems as allowed by patient condition and nursing input is negative    No visits with results within 4 Month(s) from this visit.   Latest known visit with results is:   Legacy Encounter on 05/11/2022   Component Date Value Ref Range Status    Pathology Report 05/11/2022    Final                    Value:Name EDGAR SPENCER                                                                                                   Accession #: [id number]            Pathologist:                   JOSSELYN PLEITEZ MD  Date of Procedure:    5/11/2022  Date Received:          5/11/2022  Date Reported           5/16/2022  Submitting Physician:   RANDI DONALDSON M.D.  Location:                    [facility]   Copy To/Referring/Attending:  SOL FALLON Other External #                                                                    FINAL DIAGNOSIS  A.  COLON, TRANSVERSE, POLYP X2, POLYPECTOMY:    -- MULTIPLE FRAGMENTS OF TUBULAR ADENOMA    B.  COLON, DESCENDING, POLYP X2, POLYPECTOMY:    -- TUBULAR ADENOMA (X2)    C.  RECTUM, POLYPECTOMY:    -- HYPERPLASTIC POLYP                                                                                                                                                                                                                                                                                                                                                                             "                                                                                                                                   Electronically Signed Out By JOSSELYN PLEITEZ MD/ALEX  By the signature on this report, the individual or group listed as making the  Final Interpretation/Diagnosis certifies that they have reviewed this case.           Clinical History:  history of polyps      Specimens Submitted As:  A: TRANSVERSE COLON POLYP X2   B: DESCENDING COLON POLYP X2   C: RECTAL COLON POLYP     Gross Description:  A:  Received in formalin, labeled with the patient's name and hospital number  and \"A\", are multiple fragments of tan, soft tissue aggregating to 1.1 x 0.5 x  0.2 cm.  The specimen is submitted in toto in one cassette.  RCC    B:  Received in formalin, labeled with the patient's name and hospital number  and \"B\", are multiple fragments of tan, soft tissue aggregating to 1.5 x                           0.6 x  0.2 cm.  The specimen is submitted in toto in one cassette.  RCC    C:  Received in formalin, labeled with the patient's name and hospital number  and \"C\", are two fragments of tan, soft tissue aggregating to 0.4 x 0.3 x 0.2  cm.  The specimen is submitted in toto in one cassette.  RCC      rcc/5/14/2022               [facility]  Department of Pathology   [id number] [place] [address]        CONVERTED FINAL DIAGNOSIS 05/11/2022    Final                    Value:A.  COLON, TRANSVERSE, POLYP X2, POLYPECTOMY:    -- MULTIPLE FRAGMENTS OF TUBULAR ADENOMA    B.  COLON, DESCENDING, POLYP X2, POLYPECTOMY:    -- TUBULAR ADENOMA (X2)    C.  RECTUM, POLYPECTOMY:    -- HYPERPLASTIC POLYP      CONVERTED CLINICAL DIAGNOSIS-HISTO* 05/11/2022    Final                    Value:history of polyps        CONVERTED GROSS DESCRIPTION 05/11/2022    Final                    Value:A:  Received in formalin, labeled with the patient's name and hospital number  and \"A\", are " "multiple fragments of tan, soft tissue aggregating to 1.1 x 0.5 x  0.2 cm.  The specimen is submitted in toto in one cassette.  RCC    B:  Received in formalin, labeled with the patient's name and hospital number  and \"B\", are multiple fragments of tan, soft tissue aggregating to 1.5 x 0.6 x  0.2 cm.  The specimen is submitted in toto in one cassette.  RCC    C:  Received in formalin, labeled with the patient's name and hospital number  and \"C\", are two fragments of tan, soft tissue aggregating to 0.4 x 0.3 x 0.2  cm.  The specimen is submitted in toto in one cassette.  RCC        CONVERTED FINAL REPORT PDF LINK TO* 05/11/2022 \\copathshare\copath\PDF 2022_Feb\[id number].pdf   Final       Radiology: Reviewed imaging in powerchart.  No results found.    No family history on file.  Social History     Socioeconomic History    Marital status:      Spouse name: None    Number of children: None    Years of education: None    Highest education level: None   Occupational History    None   Tobacco Use    Smoking status: Every Day     Current packs/day: 1.00     Types: Cigarettes    Smokeless tobacco: Never   Substance and Sexual Activity    Alcohol use: Yes    Drug use: None    Sexual activity: None   Other Topics Concern    None   Social History Narrative    None     Social Determinants of Health     Financial Resource Strain: Not on file   Food Insecurity: Not on file   Transportation Needs: Not on file   Physical Activity: Not on file   Stress: Not on file   Social Connections: Not on file   Intimate Partner Violence: Not on file   Housing Stability: Not on file     Past Medical History:   Diagnosis Date    Abnormal findings on diagnostic imaging of other specified body structures 09/06/2019    Abnormal MRI    Personal history of other diseases of the digestive system     History of constipation    Personal history of other specified conditions     History of diarrhea     Past Surgical History:   Procedure " Laterality Date    CHOLECYSTECTOMY  08/30/2013    Cholecystectomy    CT HEAD ANGIO W AND WO IV CONTRAST  12/8/2021    CT HEAD ANGIO W AND WO IV CONTRAST 12/8/2021 [facility]    HYSTERECTOMY  08/30/2013    Hysterectomy    OTHER SURGICAL HISTORY  03/05/2019    Tonsillectomy       Scribe Attestation  By signing my name below, I, Eleno Zuniga   attest that this documentation has been prepared under the direction and in the presence of Stanley Curiel MD.

## 2024-04-23 ENCOUNTER — TELEPHONE (OUTPATIENT)
Dept: PRIMARY CARE | Facility: CLINIC | Age: 69
End: 2024-04-23
Payer: MEDICARE

## 2024-04-23 DIAGNOSIS — Z00.00 ROUTINE GENERAL MEDICAL EXAMINATION AT A HEALTH CARE FACILITY: ICD-10-CM

## 2024-04-23 NOTE — TELEPHONE ENCOUNTER
PATIENT WAS SEEN YESTERDAY BUT HER SCRIPT FOR HER NITROGLYCERIN WASN'T SENT TO THE PHARMACY. SHE STATES THAT DR FALLON WRITES FOR THIS ONCE A YEAR FOR HER ANGINA    [facility]

## 2024-04-24 RX ORDER — NITROGLYCERIN 0.4 MG/1
0.4 TABLET SUBLINGUAL EVERY 5 MIN PRN
Qty: 90 TABLET | Refills: 1 | Status: SHIPPED | OUTPATIENT
Start: 2024-04-24

## 2024-05-13 ENCOUNTER — OFFICE VISIT (OUTPATIENT)
Dept: PRIMARY CARE | Facility: CLINIC | Age: 69
End: 2024-05-13
Payer: MEDICARE

## 2024-05-13 VITALS
HEIGHT: 62 IN | HEART RATE: 85 BPM | BODY MASS INDEX: 31.83 KG/M2 | SYSTOLIC BLOOD PRESSURE: 195 MMHG | OXYGEN SATURATION: 97 % | DIASTOLIC BLOOD PRESSURE: 76 MMHG | WEIGHT: 173 LBS

## 2024-05-13 DIAGNOSIS — E13.69 OTHER SPECIFIED DIABETES MELLITUS WITH OTHER SPECIFIED COMPLICATION, UNSPECIFIED WHETHER LONG TERM INSULIN USE (MULTI): ICD-10-CM

## 2024-05-13 DIAGNOSIS — G89.29 CHRONIC RIGHT SHOULDER PAIN: Primary | ICD-10-CM

## 2024-05-13 DIAGNOSIS — I10 PRIMARY HYPERTENSION: ICD-10-CM

## 2024-05-13 DIAGNOSIS — M25.511 CHRONIC RIGHT SHOULDER PAIN: Primary | ICD-10-CM

## 2024-05-13 PROCEDURE — 20610 DRAIN/INJ JOINT/BURSA W/O US: CPT | Performed by: EMERGENCY MEDICINE

## 2024-05-13 PROCEDURE — 1159F MED LIST DOCD IN RCRD: CPT | Performed by: EMERGENCY MEDICINE

## 2024-05-13 PROCEDURE — 4010F ACE/ARB THERAPY RXD/TAKEN: CPT | Performed by: EMERGENCY MEDICINE

## 2024-05-13 PROCEDURE — 3077F SYST BP >= 140 MM HG: CPT | Performed by: EMERGENCY MEDICINE

## 2024-05-13 PROCEDURE — 4004F PT TOBACCO SCREEN RCVD TLK: CPT | Performed by: EMERGENCY MEDICINE

## 2024-05-13 PROCEDURE — 99212 OFFICE O/P EST SF 10 MIN: CPT | Performed by: EMERGENCY MEDICINE

## 2024-05-13 PROCEDURE — 3078F DIAST BP <80 MM HG: CPT | Performed by: EMERGENCY MEDICINE

## 2024-05-13 RX ADMIN — METHYLPREDNISOLONE ACETATE 80 MG: 40 INJECTION, SUSPENSION INTRA-ARTICULAR; INTRALESIONAL; INTRAMUSCULAR; SOFT TISSUE at 08:45

## 2024-05-13 ASSESSMENT — PATIENT HEALTH QUESTIONNAIRE - PHQ9
1. LITTLE INTEREST OR PLEASURE IN DOING THINGS: NOT AT ALL
2. FEELING DOWN, DEPRESSED OR HOPELESS: NOT AT ALL
SUM OF ALL RESPONSES TO PHQ9 QUESTIONS 1 AND 2: 0

## 2024-05-13 NOTE — PROGRESS NOTES
Subjective   Patient ID: Kimberly Bain is a 69 y.o. female who presents for Shoulder Pain.    Assessment/Plan   Problem List Items Addressed This Visit       Hypertension    Diabetes mellitus (Multi)    Right shoulder pain - Primary     Right shoulder pain- right shoulder injected with 2 cc of Depo-Medrol  under aseptic precautions. Patient tolerated well. No complications      Hypertension- BP elevated however patient states that she did not take her medication. Counseled on compliance. Continue metoprolol 25 mg BID and lisinopril 20 mg. Will monitor.      anxiety and depression-cont increased dose of Lexapro. She is already on BuSpar. Declined to see a counselor or therapist or psychiatrist. Not suicidal or homicidal     Vertigo- meclizine prn      COPD- currently controlled, continue steroid inhaler      Diabetes- follows with endocrinology. Significant progress with ozempic, last A1c 5.3      GERD-pantoprazole     Hyperlipidemia-on Crestor     Preventive care  Had Prevnar 13 in 2018. Is due for Pneumovax 23   She was counseled about taking a shingles shot from an outside pharmacy  Follows up with GYN for Pap smear and mammogram  Follows up with GI regularly for colonoscopies     Follow up in 3 months or sooner as needed     Source of history: Nurse, Medical personnel, Medical record, Patient.  History limitation: None.    HPI  69 y.o. female here for follow up visit     Presents for steroid injection in her right shoulder.  Pain has been ongoing for the last 2 years and steroid injections have helped in the past. She is a  and has difficulty lifting items on the belt.     BP is significantly elevated today however patient states that she did not take her medication.      Allergies   Allergen Reactions    Penicillins Other    Sulfa (Sulfonamide Antibiotics) Other       Current Outpatient Medications   Medication Sig Dispense Refill    albuterol 90 mcg/actuation inhaler inhale 1 to 2 puffs by mouth  every 4 to 6 hours as needed 6.7 g 1    amitriptyline (Elavil) 25 mg tablet Take 1 tablet (25 mg) by mouth once daily at bedtime. 90 tablet 1    aspirin 325 mg tablet Take by mouth.      budesonide-formoteroL (Symbicort) 160-4.5 mcg/actuation inhaler Inhale 2 puffs 2 times a day.      busPIRone (Buspar) 10 mg tablet Take 1 tablet (10 mg) by mouth 3 times a day. 90 tablet 3    clindamycin (Cleocin) 300 mg capsule TAKE ONE CAPSULE BY MOUTH EVERY 6 HOURS UNTIL GONE      cyclobenzaprine (Flexeril) 10 mg tablet Take 1 tablet (10 mg) by mouth once daily at bedtime.      doxycycline (Vibramycin) 100 mg capsule Take by mouth every 12 hours.      dulaglutide (Trulicity) 0.75 mg/0.5 mL pen injector Inject under the skin.      escitalopram (Lexapro) 20 mg tablet Take 1 tablet (20 mg) by mouth once daily. 90 tablet 1    ezetimibe (Zetia) 10 mg tablet Take 1 tablet (10 mg) by mouth once daily.      lisinopril 20 mg tablet TAKE ONE TABLET (20 MG) BY MOUTH ONCE DAILY IN THE EVENING. 90 tablet 1    lisinopriL-hydrochlorothiazide 10-12.5 mg tablet Take 1 tablet by mouth once daily. 90 tablet 1    meclizine (Antivert) 25 mg tablet Take 1 tablet (25 mg) by mouth 3 times a day as needed.      metFORMIN XR (Glucophage-XR) 500 mg 24 hr tablet Take 1 tablet (500 mg) by mouth 2 times a day with meals.      metoclopramide (Reglan) 10 mg tablet Take 1 tablet (10 mg) by mouth 3 times a day.      metoprolol tartrate (Lopressor) 25 mg tablet Take 1 tablet (25 mg) by mouth 2 times a day. 60 tablet 2    nitroglycerin (Nitrostat) 0.4 mg SL tablet Place 1 tablet (0.4 mg) under the tongue every 5 minutes if needed for chest pain. 90 tablet 1    OneTouch Ultra Test strip TEST 2 TIMES DAILY.      pantoprazole (ProtoNix) 40 mg EC tablet Take 1 tablet (40 mg) by mouth 2 times a day. Do not crush, chew, or split. 180 tablet 1    rosuvastatin (Crestor) 40 mg tablet Take 1 tablet (40 mg) by mouth once daily. 90 tablet 1     No current facility-administered  "medications for this visit.       Objective   Visit Vitals  BP (!) 195/76   Pulse 85   Ht 1.575 m (5' 2\")   Wt 78.5 kg (173 lb)   SpO2 97%   BMI 31.64 kg/m²   Smoking Status Every Day   BSA 1.85 m²     Physical Exam  Vital signs as per nursing/MA documentation   General appearance: Alert and in no acute distress  HEENT: Normal Inspection   Neck: Normal Inspection   Respiratory: No respiratory distress Lungs are clear   Cardiovascular: Heart rate normal. No gallop  Back: Normal Inspection   Skin inspection: Warm   Musculoskeletal: No deformities   Neuro: Limited exam. Baseline    Review of Systems  Comprehensive review of systems as allowed by patient condition and nursing input is negative    No visits with results within 4 Month(s) from this visit.   Latest known visit with results is:   Legacy Encounter on 05/11/2022   Component Date Value Ref Range Status    Pathology Report 05/11/2022    Final                    Value:Name EDGAR SPENCER                                                                                                   Accession #: [id number]            Pathologist:                   JOSSELYN PLEITEZ MD  Date of Procedure:    5/11/2022  Date Received:          5/11/2022  Date Reported           5/16/2022  Submitting Physician:   RANDI DONALDSON M.D.  Location:                    [facility]   Copy To/Referring/Attending:  SOL FALLON Other External #                                                                    FINAL DIAGNOSIS  A.  COLON, TRANSVERSE, POLYP X2, POLYPECTOMY:    -- MULTIPLE FRAGMENTS OF TUBULAR ADENOMA    B.  COLON, DESCENDING, POLYP X2, POLYPECTOMY:    -- TUBULAR ADENOMA (X2)    C.  RECTUM, POLYPECTOMY:    -- HYPERPLASTIC POLYP                                                                                                                                                                                                                                                                " "                                                                                                                                                                                                                                               Electronically Signed Out By JOSSELYN PLEITEZ MD/ALEX  By the signature on this report, the individual or group listed as making the  Final Interpretation/Diagnosis certifies that they have reviewed this case.           Clinical History:  history of polyps      Specimens Submitted As:  A: TRANSVERSE COLON POLYP X2   B: DESCENDING COLON POLYP X2   C: RECTAL COLON POLYP     Gross Description:  A:  Received in formalin, labeled with the patient's name and hospital number  and \"A\", are multiple fragments of tan, soft tissue aggregating to 1.1 x 0.5 x  0.2 cm.  The specimen is submitted in toto in one cassette.  RCC    B:  Received in formalin, labeled with the patient's name and hospital number  and \"B\", are multiple fragments of tan, soft tissue aggregating to 1.5 x                           0.6 x  0.2 cm.  The specimen is submitted in toto in one cassette.  RCC    C:  Received in formalin, labeled with the patient's name and hospital number  and \"C\", are two fragments of tan, soft tissue aggregating to 0.4 x 0.3 x 0.2  cm.  The specimen is submitted in toto in one cassette.  RCC      rcc/5/14/2022               [facility]  Department of Pathology   [address] [place] [address]        CONVERTED FINAL DIAGNOSIS 05/11/2022    Final                    Value:A.  COLON, TRANSVERSE, POLYP X2, POLYPECTOMY:    -- MULTIPLE FRAGMENTS OF TUBULAR ADENOMA    B.  COLON, DESCENDING, POLYP X2, POLYPECTOMY:    -- TUBULAR ADENOMA (X2)    C.  RECTUM, POLYPECTOMY:    -- HYPERPLASTIC POLYP      CONVERTED CLINICAL DIAGNOSIS-HISTO* 05/11/2022    Final                    Value:history of polyps        CONVERTED GROSS DESCRIPTION 05/11/2022    Final                " "    Value:A:  Received in formalin, labeled with the patient's name and hospital number  and \"A\", are multiple fragments of tan, soft tissue aggregating to 1.1 x 0.5 x  0.2 cm.  The specimen is submitted in toto in one cassette.  RCC    B:  Received in formalin, labeled with the patient's name and hospital number  and \"B\", are multiple fragments of tan, soft tissue aggregating to 1.5 x 0.6 x  0.2 cm.  The specimen is submitted in toto in one cassette.  RCC    C:  Received in formalin, labeled with the patient's name and hospital number  and \"C\", are two fragments of tan, soft tissue aggregating to 0.4 x 0.3 x 0.2  cm.  The specimen is submitted in toto in one cassette.  RCC        CONVERTED FINAL REPORT PDF LINK TO* 05/11/2022 \\copathshare\copath\PDF 2022_Feb\[id number]   Final       Radiology: Reviewed imaging in powerchart.  BI mammo bilateral screening tomosynthesis    Result Date: 4/22/2024  These images are not reportable by radiology and will not be interpreted by  Radiologists.      No family history on file.  Social History     Socioeconomic History    Marital status:      Spouse name: None    Number of children: None    Years of education: None    Highest education level: None   Occupational History    None   Tobacco Use    Smoking status: Every Day     Current packs/day: 1.00     Types: Cigarettes    Smokeless tobacco: Never   Substance and Sexual Activity    Alcohol use: Yes    Drug use: None    Sexual activity: None   Other Topics Concern    None   Social History Narrative    None     Social Determinants of Health     Financial Resource Strain: Not on file   Food Insecurity: Not on file   Transportation Needs: Not on file   Physical Activity: Not on file   Stress: Not on file   Social Connections: Not on file   Intimate Partner Violence: Not on file   Housing Stability: Not on file     Past Medical History:   Diagnosis Date    Abnormal findings on diagnostic imaging of other specified " body structures 09/06/2019    Abnormal MRI    Personal history of other diseases of the digestive system     History of constipation    Personal history of other specified conditions     History of diarrhea     Past Surgical History:   Procedure Laterality Date    CHOLECYSTECTOMY  08/30/2013    Cholecystectomy    CT HEAD ANGIO W AND WO IV CONTRAST  12/8/2021    CT HEAD ANGIO W AND WO IV CONTRAST 12/8/2021 [facility]    HYSTERECTOMY  08/30/2013    Hysterectomy    OTHER SURGICAL HISTORY  03/05/2019    Tonsillectomy       Scribe Attestation  By signing my name below, I, Eleno Zuniga   attest that this documentation has been prepared under the direction and in the presence of Stanley Curiel MD.

## 2024-05-15 ENCOUNTER — TELEPHONE (OUTPATIENT)
Dept: PRIMARY CARE | Facility: CLINIC | Age: 69
End: 2024-05-15

## 2024-05-15 ENCOUNTER — APPOINTMENT (OUTPATIENT)
Dept: PRIMARY CARE | Facility: CLINIC | Age: 69
End: 2024-05-15
Payer: MEDICARE

## 2024-05-15 DIAGNOSIS — J44.9 CHRONIC OBSTRUCTIVE PULMONARY DISEASE, UNSPECIFIED COPD TYPE (MULTI): ICD-10-CM

## 2024-05-15 RX ORDER — ALBUTEROL SULFATE 90 UG/1
AEROSOL, METERED RESPIRATORY (INHALATION)
Qty: 6.7 G | Refills: 1 | Status: SHIPPED | OUTPATIENT
Start: 2024-05-15

## 2024-05-21 RX ORDER — METHYLPREDNISOLONE ACETATE 40 MG/ML
80 INJECTION, SUSPENSION INTRA-ARTICULAR; INTRALESIONAL; INTRAMUSCULAR; SOFT TISSUE ONCE
Status: COMPLETED | OUTPATIENT
Start: 2024-05-13 | End: 2024-05-13

## 2024-05-30 ENCOUNTER — TELEPHONE (OUTPATIENT)
Dept: PRIMARY CARE | Facility: CLINIC | Age: 69
End: 2024-05-30
Payer: MEDICARE

## 2024-05-30 DIAGNOSIS — R42 ANTICONVULSANT-INDUCED DIZZINESS: ICD-10-CM

## 2024-05-30 DIAGNOSIS — R42 DIZZINESS: ICD-10-CM

## 2024-05-30 DIAGNOSIS — T42.75XA ANTICONVULSANT-INDUCED DIZZINESS: ICD-10-CM

## 2024-05-30 RX ORDER — MECLIZINE HYDROCHLORIDE 25 MG/1
25 TABLET ORAL 3 TIMES DAILY PRN
Qty: 30 TABLET | Refills: 1 | Status: SHIPPED | OUTPATIENT
Start: 2024-05-30

## 2024-05-30 NOTE — TELEPHONE ENCOUNTER
Refill    [facility]  [facility]    Patient is completley out of med and has an apt on 6/5/24

## 2024-06-05 ENCOUNTER — OFFICE VISIT (OUTPATIENT)
Dept: PRIMARY CARE | Facility: CLINIC | Age: 69
End: 2024-06-05
Payer: MEDICARE

## 2024-06-05 VITALS
WEIGHT: 170 LBS | HEART RATE: 78 BPM | HEIGHT: 62 IN | OXYGEN SATURATION: 97 % | BODY MASS INDEX: 31.28 KG/M2 | SYSTOLIC BLOOD PRESSURE: 168 MMHG | DIASTOLIC BLOOD PRESSURE: 73 MMHG

## 2024-06-05 DIAGNOSIS — I15.9 SECONDARY HYPERTENSION: ICD-10-CM

## 2024-06-05 DIAGNOSIS — K21.9 GASTROESOPHAGEAL REFLUX DISEASE, UNSPECIFIED WHETHER ESOPHAGITIS PRESENT: ICD-10-CM

## 2024-06-05 DIAGNOSIS — I67.1 BRAIN ANEURYSM (HHS-HCC): Primary | ICD-10-CM

## 2024-06-05 DIAGNOSIS — E13.69 OTHER SPECIFIED DIABETES MELLITUS WITH OTHER SPECIFIED COMPLICATION, UNSPECIFIED WHETHER LONG TERM INSULIN USE (MULTI): ICD-10-CM

## 2024-06-05 PROCEDURE — 3078F DIAST BP <80 MM HG: CPT | Performed by: EMERGENCY MEDICINE

## 2024-06-05 PROCEDURE — 3077F SYST BP >= 140 MM HG: CPT | Performed by: EMERGENCY MEDICINE

## 2024-06-05 PROCEDURE — 4010F ACE/ARB THERAPY RXD/TAKEN: CPT | Performed by: EMERGENCY MEDICINE

## 2024-06-05 PROCEDURE — 4004F PT TOBACCO SCREEN RCVD TLK: CPT | Performed by: EMERGENCY MEDICINE

## 2024-06-05 PROCEDURE — 1159F MED LIST DOCD IN RCRD: CPT | Performed by: EMERGENCY MEDICINE

## 2024-06-05 PROCEDURE — 99214 OFFICE O/P EST MOD 30 MIN: CPT | Performed by: EMERGENCY MEDICINE

## 2024-06-05 RX ORDER — LISINOPRIL AND HYDROCHLOROTHIAZIDE 10; 12.5 MG/1; MG/1
1 TABLET ORAL DAILY
Qty: 90 TABLET | Refills: 1 | Status: SHIPPED | OUTPATIENT
Start: 2024-06-05

## 2024-06-05 RX ORDER — PANTOPRAZOLE SODIUM 40 MG/1
40 TABLET, DELAYED RELEASE ORAL 2 TIMES DAILY
Qty: 180 TABLET | Refills: 1 | Status: SHIPPED | OUTPATIENT
Start: 2024-06-05

## 2024-06-05 ASSESSMENT — ENCOUNTER SYMPTOMS: HEADACHES: 1

## 2024-06-05 NOTE — LETTER
June 5, 2024     Patient: Kimberly Bain   YOB: 1955   Date of Visit: 6/5/2024       To Whom It May Concern:    It is my medical opinion that Kimberly Bain may return to full duty immediately with no restrictions.    If you have any questions or concerns, please don't hesitate to call.         Sincerely,        Stanley Curiel MD

## 2024-06-05 NOTE — PROGRESS NOTES
"Subjective   Patient ID: Kimberly Bain is a 69 y.o. female who presents for Headache and Follow-up    Assessment/Plan   Problem List Items Addressed This Visit          Cardiac and Vasculature    Hypertension    Relevant Medications    lisinopriL-hydrochlorothiazide 10-12.5 mg tablet    Other Relevant Orders    Basic metabolic panel       Endocrine/Metabolic    Diabetes mellitus (Multi)    Relevant Orders    Basic metabolic panel       Gastrointestinal and Abdominal    Gastroesophageal reflux disease    Relevant Medications    pantoprazole (ProtoNix) 40 mg EC tablet     Other Visit Diagnoses       Brain aneurysm ([facility]HS-HCC)    -  Primary    Relevant Orders    CT head w IV contrast          Right shoulder pain- right shoulder injected with 2 cc of Depo-Medrol  under aseptic precautions. Patient tolerated well. No complications      Hypertension- Continue metoprolol 25 mg BID and lisinopril 20 mg.      anxiety and depression-cont increased dose of Lexapro. She is already on BuSpar. Declined to see a counselor or therapist or psychiatrist. Not suicidal or homicidal     Vertigo- meclizine prn      COPD- currently controlled, continue steroid inhaler      Diabetes- follows with endocrinology. Significant progress with ozempic, last A1c 5.3      GERD-pantoprazole     Hyperlipidemia-on Crestor    Source of history: Nurse, Medical personnel, Medical record, Patient.  History limitation: None.    HPI     69 y.o. female here for headaches x3 days. Patient states on Monday at work having sudden blurry vision and episode of dizziness. She also states having a headache, which feels like \"hot rods through her head.\" She states having no relief from Tylenol. She has continued to have a headache since Monday. CTA ordered due to having a prior aneurysm found in 2018.     Also states feeling needles on arm only when she is in the shower and water hits her arm.     BP was elevated during today's visit, 190 systolics. Was given " clonidine 0.2 mg and re-checked blood pressure. Blood pressure has come down, see vitals in objectives. Patient OK to go home.     Allergies   Allergen Reactions    Penicillins Other    Sulfa (Sulfonamide Antibiotics) Other       Current Outpatient Medications   Medication Sig Dispense Refill    albuterol 90 mcg/actuation inhaler inhale 1 to 2 puffs by mouth every 4 to 6 hours as needed 6.7 g 1    amitriptyline (Elavil) 25 mg tablet Take 1 tablet (25 mg) by mouth once daily at bedtime. 90 tablet 1    aspirin 325 mg tablet Take by mouth.      budesonide-formoteroL (Symbicort) 160-4.5 mcg/actuation inhaler Inhale 2 puffs 2 times a day.      busPIRone (Buspar) 10 mg tablet Take 1 tablet (10 mg) by mouth 3 times a day. 90 tablet 3    clindamycin (Cleocin) 300 mg capsule TAKE ONE CAPSULE BY MOUTH EVERY 6 HOURS UNTIL GONE      cyclobenzaprine (Flexeril) 10 mg tablet Take 1 tablet (10 mg) by mouth once daily at bedtime.      doxycycline (Vibramycin) 100 mg capsule Take by mouth every 12 hours.      dulaglutide (Trulicity) 0.75 mg/0.5 mL pen injector Inject under the skin.      escitalopram (Lexapro) 20 mg tablet Take 1 tablet (20 mg) by mouth once daily. 90 tablet 1    ezetimibe (Zetia) 10 mg tablet Take 1 tablet (10 mg) by mouth once daily.      lisinopril 20 mg tablet TAKE ONE TABLET (20 MG) BY MOUTH ONCE DAILY IN THE EVENING. 90 tablet 1    meclizine (Antivert) 25 mg tablet Take 1 tablet (25 mg) by mouth 3 times a day as needed for dizziness. 30 tablet 1    metFORMIN XR (Glucophage-XR) 500 mg 24 hr tablet Take 1 tablet (500 mg) by mouth 2 times daily (morning and late afternoon).      metoclopramide (Reglan) 10 mg tablet Take 1 tablet (10 mg) by mouth 3 times a day.      metoprolol tartrate (Lopressor) 25 mg tablet Take 1 tablet (25 mg) by mouth 2 times a day. 60 tablet 2    nitroglycerin (Nitrostat) 0.4 mg SL tablet Place 1 tablet (0.4 mg) under the tongue every 5 minutes if needed for chest pain. 90 tablet 1     "OneTouch Ultra Test strip TEST 2 TIMES DAILY.      rosuvastatin (Crestor) 40 mg tablet Take 1 tablet (40 mg) by mouth once daily. 90 tablet 1    lisinopriL-hydrochlorothiazide 10-12.5 mg tablet Take 1 tablet by mouth once daily. 90 tablet 1    pantoprazole (ProtoNix) 40 mg EC tablet Take 1 tablet (40 mg) by mouth 2 times a day. Do not crush, chew, or split. 180 tablet 1     No current facility-administered medications for this visit.       Objective     Vital Signs  /73  HR 78  SpO2 97%  Weight 77.1 kg  Height 1.575 m (5'2\")    Physical Exam  Vital signs as per nursing/MA documentation   General appearance: Alert and in no acute distress  HEENT: Normal Inspection   Neck: Normal Inspection   Respiratory: No respiratory distress Lungs are clear   Cardiovascular: Heart rate normal. No gallop  Back: Normal Inspection   Skin inspection: Warm   Musculoskeletal: No deformities   Neuro: Limited exam. Baseline    Review of Systems   Neurological:  Positive for headaches.     Comprehensive review of systems as allowed by patient condition and nursing input is negative    No visits with results within 4 Month(s) from this visit.   Latest known visit with results is:   Legacy Encounter on 05/11/2022   Component Date Value Ref Range Status    Pathology Report 05/11/2022    Final                    Value:Name EDGAR SPENCER                                                                                                   Accession #: [id number]            Pathologist:                   JOSSELYN PLEITEZ MD  Date of Procedure:    5/11/2022  Date Received:          5/11/2022  Date Reported           5/16/2022  Submitting Physician:   RANDI DONALDSON M.D.  Location:                    [facility]   Copy To/Referring/Attending:  SOL FALLON Other External #                                                                    FINAL DIAGNOSIS  A.  COLON, TRANSVERSE, POLYP X2, POLYPECTOMY:    -- MULTIPLE FRAGMENTS OF TUBULAR " "ADENOMA    B.  COLON, DESCENDING, POLYP X2, POLYPECTOMY:    -- TUBULAR ADENOMA (X2)    C.  RECTUM, POLYPECTOMY:    -- HYPERPLASTIC POLYP                                                                                                                                                                                                                                                                                                                                                                                                                                                                                                               Electronically Signed Out By JOSSELYN PLEITEZ MD/ALEX  By the signature on this report, the individual or group listed as making the  Final Interpretation/Diagnosis certifies that they have reviewed this case.           Clinical History:  history of polyps      Specimens Submitted As:  A: TRANSVERSE COLON POLYP X2   B: DESCENDING COLON POLYP X2   C: RECTAL COLON POLYP     Gross Description:  A:  Received in formalin, labeled with the patient's name and hospital number  and \"A\", are multiple fragments of tan, soft tissue aggregating to 1.1 x 0.5 x  0.2 cm.  The specimen is submitted in toto in one cassette.  RCC    B:  Received in formalin, labeled with the patient's name and hospital number  and \"B\", are multiple fragments of tan, soft tissue aggregating to 1.5 x                           0.6 x  0.2 cm.  The specimen is submitted in toto in one cassette.  RCC    C:  Received in formalin, labeled with the patient's name and hospital number  and \"C\", are two fragments of tan, soft tissue aggregating to 0.4 x 0.3 x 0.2  cm.  The specimen is submitted in toto in one cassette.  RCC      rcc/5/14/2022               [facility]  Department of Pathology   [address] [place] [address]        CONVERTED FINAL DIAGNOSIS 05/11/2022    Final                    " "Value:A.  COLON, TRANSVERSE, POLYP X2, POLYPECTOMY:    -- MULTIPLE FRAGMENTS OF TUBULAR ADENOMA    B.  COLON, DESCENDING, POLYP X2, POLYPECTOMY:    -- TUBULAR ADENOMA (X2)    C.  RECTUM, POLYPECTOMY:    -- HYPERPLASTIC POLYP      CONVERTED CLINICAL DIAGNOSIS-HISTO* 05/11/2022    Final                    Value:history of polyps        CONVERTED GROSS DESCRIPTION 05/11/2022    Final                    Value:A:  Received in formalin, labeled with the patient's name and hospital number  and \"A\", are multiple fragments of tan, soft tissue aggregating to 1.1 x 0.5 x  0.2 cm.  The specimen is submitted in toto in one cassette.  RCC    B:  Received in formalin, labeled with the patient's name and hospital number  and \"B\", are multiple fragments of tan, soft tissue aggregating to 1.5 x 0.6 x  0.2 cm.  The specimen is submitted in toto in one cassette.  RCC    C:  Received in formalin, labeled with the patient's name and hospital number  and \"C\", are two fragments of tan, soft tissue aggregating to 0.4 x 0.3 x 0.2  cm.  The specimen is submitted in toto in one cassette.  RCC        CONVERTED FINAL REPORT PDF LINK TO* 05/11/2022 \\copathshare\copath\PDF 2022_Feb\[id number]   Final       Radiology: Reviewed imaging in powerchart.  BI mammo bilateral screening tomosynthesis    Result Date: 4/22/2024  These images are not reportable by radiology and will not be interpreted by  Radiologists.      No family history on file.  Social History     Socioeconomic History    Marital status:      Spouse name: None    Number of children: None    Years of education: None    Highest education level: None   Occupational History    None   Tobacco Use    Smoking status: Every Day     Current packs/day: 1.00     Types: Cigarettes    Smokeless tobacco: Never   Vaping Use    Vaping status: Never Used   Substance and Sexual Activity    Alcohol use: Yes    Drug use: Never    Sexual activity: None   Other Topics Concern    None   Social " History Narrative    None     Social Determinants of Health     Financial Resource Strain: Not on file   Food Insecurity: Not on file   Transportation Needs: Not on file   Physical Activity: Not on file   Stress: Not on file   Social Connections: Not on file   Intimate Partner Violence: Not on file   Housing Stability: Not on file     Past Medical History:   Diagnosis Date    Abnormal findings on diagnostic imaging of other specified body structures 09/06/2019    Abnormal MRI    Personal history of other diseases of the digestive system     History of constipation    Personal history of other specified conditions     History of diarrhea     Past Surgical History:   Procedure Laterality Date    CHOLECYSTECTOMY  08/30/2013    Cholecystectomy    CT HEAD ANGIO W AND WO IV CONTRAST  12/8/2021    CT HEAD ANGIO W AND WO IV CONTRAST 12/8/2021 [facility]    HYSTERECTOMY  08/30/2013    Hysterectomy    OTHER SURGICAL HISTORY  03/05/2019    Tonsillectomy       Scribe Attestation  By signing my name below, IPEPITO , Eleno   attest that this documentation has been prepared under the direction and in the presence of Stanley Curiel MD.

## 2024-06-06 DIAGNOSIS — I15.9 SECONDARY HYPERTENSION: ICD-10-CM

## 2024-06-06 RX ORDER — LISINOPRIL 20 MG/1
TABLET ORAL
Qty: 90 TABLET | Refills: 1 | Status: SHIPPED | OUTPATIENT
Start: 2024-06-06

## 2024-06-11 ENCOUNTER — TELEPHONE (OUTPATIENT)
Dept: PRIMARY CARE | Facility: CLINIC | Age: 69
End: 2024-06-11
Payer: MEDICARE

## 2024-06-11 NOTE — TELEPHONE ENCOUNTER
Pt wants to know if she can take two meclizines instead of one since her dizziness has not gotten better. Pt also says that her systolic number is giving her a reading of 140/160. Doesn't know if she should do any changes to her medication. Please advice

## 2024-06-20 ENCOUNTER — TELEPHONE (OUTPATIENT)
Dept: PRIMARY CARE | Facility: CLINIC | Age: 69
End: 2024-06-20
Payer: MEDICARE

## 2024-06-20 DIAGNOSIS — I67.1 BRAIN ANEURYSM (HHS-HCC): ICD-10-CM

## 2024-07-01 LAB
NON-UH HIE BUN/CREAT RATIO: 16.7
NON-UH HIE BUN: 15 MG/DL (ref 9–23)
NON-UH HIE CALCIUM: 9.2 MG/DL (ref 8.7–10.4)
NON-UH HIE CALCULATED OSMOLALITY: 285 MOSM/KG (ref 275–295)
NON-UH HIE CHLORIDE: 108 MMOL/L (ref 98–107)
NON-UH HIE CO2, VENOUS: 33 MMOL/L (ref 20–31)
NON-UH HIE CREATININE: 0.9 MG/DL (ref 0.5–0.8)
NON-UH HIE GFR AA: >60
NON-UH HIE GLOMERULAR FILTRATION RATE: >60 ML/MIN/1.73M?
NON-UH HIE GLUCOSE: 91 MG/DL (ref 74–106)
NON-UH HIE K: 4.2 MMOL/L (ref 3.5–5.1)
NON-UH HIE NA: 143 MMOL/L (ref 135–145)

## 2024-07-11 ENCOUNTER — TELEPHONE (OUTPATIENT)
Dept: PRIMARY CARE | Facility: CLINIC | Age: 69
End: 2024-07-11
Payer: MEDICARE

## 2024-07-11 NOTE — TELEPHONE ENCOUNTER
Trouble with right arm and shoulder for a year.  Was at store getting a case of water, she hurt her right arm real bad.      PT has to work tomorrow from 8 to 4 and she was wanting an order to have an xray.  If an appointment is needed she can do a virtual tomorrow.  She will be at work, but can step away.      It is the shoulder that PT get shots in from Dr Curiel  every so often.

## 2024-07-12 NOTE — TELEPHONE ENCOUNTER
PT CALLED BACK. WAS UNAWARE DR FALLON WAS NOT IN OFFICE UNTIL MONDAY. STATES IN A LOT OF PAIN. I DID OFFER APPT WITH ANOTHER PROVIDER AND DECLINED DUE TO WORK UNTIL 4. WILL WAIT TO MONDAY.

## 2024-08-14 LAB — HEMOGLOBIN A1C/HEMOGLOBIN TOTAL IN BLOOD EXTERNAL: 5.3 %

## 2024-08-26 ENCOUNTER — APPOINTMENT (OUTPATIENT)
Dept: PRIMARY CARE | Facility: CLINIC | Age: 69
End: 2024-08-26
Payer: MEDICARE

## 2024-08-26 VITALS
HEIGHT: 62 IN | OXYGEN SATURATION: 97 % | DIASTOLIC BLOOD PRESSURE: 75 MMHG | SYSTOLIC BLOOD PRESSURE: 169 MMHG | HEART RATE: 74 BPM | WEIGHT: 168 LBS | BODY MASS INDEX: 30.91 KG/M2

## 2024-08-26 DIAGNOSIS — Z00.00 WELLNESS EXAMINATION: Primary | ICD-10-CM

## 2024-08-26 DIAGNOSIS — C53.9 MALIGNANT NEOPLASM OF CERVIX, UNSPECIFIED SITE (MULTI): ICD-10-CM

## 2024-08-26 DIAGNOSIS — I10 PRIMARY HYPERTENSION: ICD-10-CM

## 2024-08-26 DIAGNOSIS — Z00.00 ROUTINE GENERAL MEDICAL EXAMINATION AT A HEALTH CARE FACILITY: ICD-10-CM

## 2024-08-26 DIAGNOSIS — I72.9 ANEURYSM (CMS-HCC): ICD-10-CM

## 2024-08-26 DIAGNOSIS — I15.9 SECONDARY HYPERTENSION: ICD-10-CM

## 2024-08-26 PROCEDURE — 3078F DIAST BP <80 MM HG: CPT | Performed by: EMERGENCY MEDICINE

## 2024-08-26 PROCEDURE — 3008F BODY MASS INDEX DOCD: CPT | Performed by: EMERGENCY MEDICINE

## 2024-08-26 PROCEDURE — 3044F HG A1C LEVEL LT 7.0%: CPT | Performed by: EMERGENCY MEDICINE

## 2024-08-26 PROCEDURE — 1159F MED LIST DOCD IN RCRD: CPT | Performed by: EMERGENCY MEDICINE

## 2024-08-26 PROCEDURE — 99497 ADVNCD CARE PLAN 30 MIN: CPT | Performed by: EMERGENCY MEDICINE

## 2024-08-26 PROCEDURE — G0439 PPPS, SUBSEQ VISIT: HCPCS | Performed by: EMERGENCY MEDICINE

## 2024-08-26 PROCEDURE — 3077F SYST BP >= 140 MM HG: CPT | Performed by: EMERGENCY MEDICINE

## 2024-08-26 PROCEDURE — 4004F PT TOBACCO SCREEN RCVD TLK: CPT | Performed by: EMERGENCY MEDICINE

## 2024-08-26 PROCEDURE — 99397 PER PM REEVAL EST PAT 65+ YR: CPT | Performed by: EMERGENCY MEDICINE

## 2024-08-26 PROCEDURE — G0446 INTENS BEHAVE THER CARDIO DX: HCPCS | Performed by: EMERGENCY MEDICINE

## 2024-08-26 PROCEDURE — 1170F FXNL STATUS ASSESSED: CPT | Performed by: EMERGENCY MEDICINE

## 2024-08-26 PROCEDURE — G0444 DEPRESSION SCREEN ANNUAL: HCPCS | Performed by: EMERGENCY MEDICINE

## 2024-08-26 PROCEDURE — G0442 ANNUAL ALCOHOL SCREEN 15 MIN: HCPCS | Performed by: EMERGENCY MEDICINE

## 2024-08-26 PROCEDURE — 99213 OFFICE O/P EST LOW 20 MIN: CPT | Performed by: EMERGENCY MEDICINE

## 2024-08-26 PROCEDURE — 4010F ACE/ARB THERAPY RXD/TAKEN: CPT | Performed by: EMERGENCY MEDICINE

## 2024-08-26 RX ORDER — LISINOPRIL 20 MG/1
TABLET ORAL
Qty: 90 TABLET | Refills: 1 | Status: SHIPPED | OUTPATIENT
Start: 2024-08-26

## 2024-08-26 RX ORDER — METOPROLOL TARTRATE 25 MG/1
25 TABLET, FILM COATED ORAL 2 TIMES DAILY
Qty: 180 TABLET | Refills: 1 | Status: SHIPPED | OUTPATIENT
Start: 2024-08-26 | End: 2025-02-22

## 2024-08-26 RX ORDER — AMITRIPTYLINE HYDROCHLORIDE 25 MG/1
25 TABLET, FILM COATED ORAL NIGHTLY
Qty: 90 TABLET | Refills: 1 | Status: SHIPPED | OUTPATIENT
Start: 2024-08-26

## 2024-08-26 ASSESSMENT — ENCOUNTER SYMPTOMS: HEADACHES: 1

## 2024-08-26 ASSESSMENT — ACTIVITIES OF DAILY LIVING (ADL)
MANAGING_FINANCES: INDEPENDENT
GROCERY_SHOPPING: INDEPENDENT
BATHING: INDEPENDENT
TAKING_MEDICATION: INDEPENDENT
DRESSING: INDEPENDENT
DOING_HOUSEWORK: INDEPENDENT

## 2024-08-26 ASSESSMENT — PATIENT HEALTH QUESTIONNAIRE - PHQ9
2. FEELING DOWN, DEPRESSED OR HOPELESS: NOT AT ALL
1. LITTLE INTEREST OR PLEASURE IN DOING THINGS: NOT AT ALL
SUM OF ALL RESPONSES TO PHQ9 QUESTIONS 1 AND 2: 0

## 2024-08-26 NOTE — PROGRESS NOTES
Subjective   Patient ID: Kimberly Bain is a 69 y.o. female who presents for Shoulder Pain    Assessment/Plan   Problem List Items Addressed This Visit       Hypertension    Malignant neoplasm of cervix, unspecified site (Multi) - Primary    Aneurysm (CMS-HCC)     Other Visit Diagnoses       Routine general medical examination at a health care facility            Medicare wellness and follow-up    Right shoulder pain-declined physical therapy.  She can have a steroid injection after September 5    Hypertension- Continue metoprolol 25 mg BID and lisinopril 20 mg.  Blood pressure is elevated.  States that she ran out of lisinopril.  Will refill it     anxiety and depression-cont increased dose of Lexapro. She is already on BuSpar. Declined to see a counselor or therapist or psychiatrist. Not suicidal or homicidal     Vertigo- meclizine prn      COPD- currently controlled, continue steroid inhaler      Diabetes- follows with endocrinology. Significant progress with ozempic, last A1c 5.3      GERD-pantoprazole     Hyperlipidemia-on Crestor      PH Q-9 depression screening was completed by authorized employee of the practice for 5-10 minutes and  explained the questionnaire and discussed the answers with the patient.    Alcohol screening was completed for 5 to 10 minutes    We had face-to-face discussion up to 15 minutes with this patient about the cardiovascular risk and behavioral therapies of nutritional choices, exercise and elimination of habits contradicting to the risk. We agreed on how they may be able to reduce their current cardiovascular risk.    I discussed advanced care planning for more than 16 minutes including the explanation and discussion of advanced directives. Information and advise was also provided on DO NOT RESUSCITATE and patient encouraged to consider this  Patient is not sure about DNR at this time.      Source of history: Nurse, Medical personnel, Medical record, Patient.  History  limitation: None.    HPI     69 y.o. female here for Medicare wellness, physical and follow-up    Lifted some heavy box and had shoulder pain.  Was seen by orthopedics.  Advised physical therapy which patient declined      Allergies   Allergen Reactions    Penicillins Other    Sulfa (Sulfonamide Antibiotics) Other       Current Outpatient Medications   Medication Sig Dispense Refill    albuterol 90 mcg/actuation inhaler inhale 1 to 2 puffs by mouth every 4 to 6 hours as needed 6.7 g 1    amitriptyline (Elavil) 25 mg tablet Take 1 tablet (25 mg) by mouth once daily at bedtime. 90 tablet 1    aspirin 325 mg tablet Take by mouth.      budesonide-formoteroL (Symbicort) 160-4.5 mcg/actuation inhaler Inhale 2 puffs 2 times a day.      busPIRone (Buspar) 10 mg tablet Take 1 tablet (10 mg) by mouth 3 times a day. 90 tablet 3    clindamycin (Cleocin) 300 mg capsule TAKE ONE CAPSULE BY MOUTH EVERY 6 HOURS UNTIL GONE      cyclobenzaprine (Flexeril) 10 mg tablet Take 1 tablet (10 mg) by mouth once daily at bedtime.      doxycycline (Vibramycin) 100 mg capsule Take by mouth every 12 hours.      dulaglutide (Trulicity) 0.75 mg/0.5 mL pen injector Inject under the skin.      escitalopram (Lexapro) 20 mg tablet Take 1 tablet (20 mg) by mouth once daily. 90 tablet 1    ezetimibe (Zetia) 10 mg tablet Take 1 tablet (10 mg) by mouth once daily.      lisinopril 20 mg tablet TAKE ONE TABLET (20 MG) BY MOUTH ONCE DAILY IN THE EVENING. 90 tablet 1    lisinopriL-hydrochlorothiazide 10-12.5 mg tablet Take 1 tablet by mouth once daily. 90 tablet 1    meclizine (Antivert) 25 mg tablet Take 1 tablet (25 mg) by mouth 3 times a day as needed for dizziness. 30 tablet 1    metFORMIN XR (Glucophage-XR) 500 mg 24 hr tablet Take 1 tablet (500 mg) by mouth 2 times daily (morning and late afternoon).      metoclopramide (Reglan) 10 mg tablet Take 1 tablet (10 mg) by mouth 3 times a day.      metoprolol tartrate (Lopressor) 25 mg tablet Take 1 tablet (25  "mg) by mouth 2 times a day. 60 tablet 2    nitroglycerin (Nitrostat) 0.4 mg SL tablet Place 1 tablet (0.4 mg) under the tongue every 5 minutes if needed for chest pain. 90 tablet 1    OneTouch Ultra Test strip TEST 2 TIMES DAILY.      pantoprazole (ProtoNix) 40 mg EC tablet Take 1 tablet (40 mg) by mouth 2 times a day. Do not crush, chew, or split. 180 tablet 1    rosuvastatin (Crestor) 40 mg tablet Take 1 tablet (40 mg) by mouth once daily. 90 tablet 1     No current facility-administered medications for this visit.       Objective     Vital Signs  /73  HR 78  SpO2 97%  Weight 77.1 kg  Height 1.575 m (5'2\")    Physical Exam  Vital signs as per nursing/MA documentation   General appearance: Alert and in no acute distress  HEENT: Normal Inspection   Neck: Normal Inspection   Respiratory: No respiratory distress Lungs are clear   Cardiovascular: Heart rate normal. No gallop  Back: Normal Inspection   Skin inspection: Warm   Musculoskeletal: No deformities   Neuro: Limited exam. Baseline    Review of Systems   Neurological:  Positive for headaches.     Comprehensive review of systems as allowed by patient condition and nursing input is negative    Scanned Document on 08/14/2024   Component Date Value Ref Range Status    Hemoglobin A1C External 03/28/2024 5.3  % Final   Orders Only on 07/01/2024   Component Date Value Ref Range Status    [facility] GFR AA 07/01/2024 >60   Final    [facility] Glomerular Filtration R* 07/01/2024 >60  mL/min/1.73m? Final    [facility] BUN 07/01/2024 15  9 - 23 mg/dL Final    [facility] Calculated Osmolality 07/01/2024 285  275 - 295 mOsm/kg Final    [facility] Glucose 07/01/2024 91  74 - 106 mg/dL Final    [facility] BUN/Creat Ratio 07/01/2024 16.7   Final    [facility] CO2, venous 07/01/2024 33.0 (H)  20.0 - 31.0 mmol/L Final    [facility] Calcium 07/01/2024 9.2  8.7 - 10.4 mg/dL Final    [facility] Creatinine 07/01/2024 0.9 (H)  0.5 - 0.8 mg/dL Final    [facility] Chloride " 07/01/2024 108 (H)  98 - 107 mmol/L Final    [facility] Na 07/01/2024 143  135 - 145 mmol/L Final    [facility] K 07/01/2024 4.2  3.5 - 5.1 mmol/L Final       Radiology: Reviewed imaging in powerchart.  BI mammo bilateral screening tomosynthesis    Result Date: 4/22/2024  These images are not reportable by radiology and will not be interpreted by  Radiologists.      No family history on file.  Social History     Socioeconomic History    Marital status:    Tobacco Use    Smoking status: Every Day     Current packs/day: 1.00     Types: Cigarettes    Smokeless tobacco: Never   Vaping Use    Vaping status: Never Used   Substance and Sexual Activity    Alcohol use: Yes    Drug use: Never     Past Medical History:   Diagnosis Date    Abnormal findings on diagnostic imaging of other specified body structures 09/06/2019    Abnormal MRI    Personal history of other diseases of the digestive system     History of constipation    Personal history of other specified conditions     History of diarrhea     Past Surgical History:   Procedure Laterality Date    CHOLECYSTECTOMY  08/30/2013    Cholecystectomy    CT HEAD ANGIO W AND WO IV CONTRAST  12/8/2021    CT HEAD ANGIO W AND WO IV CONTRAST 12/8/2021 [facility]    HYSTERECTOMY  08/30/2013    Hysterectomy    OTHER SURGICAL HISTORY  03/05/2019    Tonsillectomy       Scribe Attestation  By signing my name below, I, Stanley Curiel MD , Scribe   attest that this documentation has been prepared under the direction and in the presence of Stanley Curiel MD.

## 2024-09-11 ENCOUNTER — APPOINTMENT (OUTPATIENT)
Dept: PRIMARY CARE | Facility: CLINIC | Age: 69
End: 2024-09-11
Payer: MEDICARE

## 2024-09-11 VITALS
SYSTOLIC BLOOD PRESSURE: 160 MMHG | BODY MASS INDEX: 32.2 KG/M2 | HEART RATE: 71 BPM | DIASTOLIC BLOOD PRESSURE: 77 MMHG | HEIGHT: 62 IN | WEIGHT: 175 LBS

## 2024-09-11 DIAGNOSIS — I72.9 ANEURYSM (CMS-HCC): ICD-10-CM

## 2024-09-11 DIAGNOSIS — F32.A DEPRESSION, UNSPECIFIED DEPRESSION TYPE: ICD-10-CM

## 2024-09-11 DIAGNOSIS — M25.511 CHRONIC RIGHT SHOULDER PAIN: Primary | ICD-10-CM

## 2024-09-11 DIAGNOSIS — G89.29 CHRONIC RIGHT SHOULDER PAIN: Primary | ICD-10-CM

## 2024-09-11 PROCEDURE — 99213 OFFICE O/P EST LOW 20 MIN: CPT | Performed by: EMERGENCY MEDICINE

## 2024-09-11 PROCEDURE — 3044F HG A1C LEVEL LT 7.0%: CPT | Performed by: EMERGENCY MEDICINE

## 2024-09-11 PROCEDURE — 4010F ACE/ARB THERAPY RXD/TAKEN: CPT | Performed by: EMERGENCY MEDICINE

## 2024-09-11 PROCEDURE — 3077F SYST BP >= 140 MM HG: CPT | Performed by: EMERGENCY MEDICINE

## 2024-09-11 PROCEDURE — 1158F ADVNC CARE PLAN TLK DOCD: CPT | Performed by: EMERGENCY MEDICINE

## 2024-09-11 PROCEDURE — 3078F DIAST BP <80 MM HG: CPT | Performed by: EMERGENCY MEDICINE

## 2024-09-11 PROCEDURE — 1123F ACP DISCUSS/DSCN MKR DOCD: CPT | Performed by: EMERGENCY MEDICINE

## 2024-09-11 PROCEDURE — 3008F BODY MASS INDEX DOCD: CPT | Performed by: EMERGENCY MEDICINE

## 2024-09-11 PROCEDURE — 1159F MED LIST DOCD IN RCRD: CPT | Performed by: EMERGENCY MEDICINE

## 2024-09-11 PROCEDURE — 4004F PT TOBACCO SCREEN RCVD TLK: CPT | Performed by: EMERGENCY MEDICINE

## 2024-09-11 PROCEDURE — 20610 DRAIN/INJ JOINT/BURSA W/O US: CPT | Performed by: EMERGENCY MEDICINE

## 2024-09-11 RX ORDER — TRIAMCINOLONE ACETONIDE 40 MG/ML
40 INJECTION, SUSPENSION INTRA-ARTICULAR; INTRAMUSCULAR ONCE
Status: COMPLETED | OUTPATIENT
Start: 2024-09-11 | End: 2024-09-11

## 2024-09-11 ASSESSMENT — ENCOUNTER SYMPTOMS: HEADACHES: 1

## 2024-09-11 NOTE — PROGRESS NOTES
Subjective   Patient ID: Kimberly Bain is a 69 y.o. female who presents for Follow-up (Injection - shoulder)    Assessment/Plan   Problem List Items Addressed This Visit    None  follow-up    Right shoulder pain - Under aseptic precautions, 2 ml kenalog was injected intraarticular into right shoulder.    Hypertension - Continue metoprolol 25 mg BID and lisinopril 20 mg.       anxiety and depression- Patient is on lexapro and buspar. No suicidal or homicidal ideation.     Vertigo- meclizine prn      COPD- currently controlled, continue steroid inhaler      Diabetes- follows with endocrinology. Significant progress with ozempic, last A1c 5.3      GERD-pantoprazole     Hyperlipidemia-on Crestor      Source of history: Nurse, Medical personnel, Medical record, Patient.  History limitation: None.    HPI     69 y.o. female here for follow-up visit    Lifted some heavy box and had shoulder pain.  Was seen by orthopedics.  Advised physical therapy which patient declined      Allergies   Allergen Reactions    Penicillins Other    Sulfa (Sulfonamide Antibiotics) Other       Current Outpatient Medications   Medication Sig Dispense Refill    albuterol 90 mcg/actuation inhaler inhale 1 to 2 puffs by mouth every 4 to 6 hours as needed 6.7 g 1    amitriptyline (Elavil) 25 mg tablet Take 1 tablet (25 mg) by mouth once daily at bedtime. 90 tablet 1    aspirin 325 mg tablet Take by mouth.      budesonide-formoteroL (Symbicort) 160-4.5 mcg/actuation inhaler Inhale 2 puffs 2 times a day.      busPIRone (Buspar) 10 mg tablet Take 1 tablet (10 mg) by mouth 3 times a day. 90 tablet 3    clindamycin (Cleocin) 300 mg capsule TAKE ONE CAPSULE BY MOUTH EVERY 6 HOURS UNTIL GONE      cyclobenzaprine (Flexeril) 10 mg tablet Take 1 tablet (10 mg) by mouth once daily at bedtime.      doxycycline (Vibramycin) 100 mg capsule Take by mouth every 12 hours.      dulaglutide (Trulicity) 0.75 mg/0.5 mL pen injector Inject under the skin.       "escitalopram (Lexapro) 20 mg tablet Take 1 tablet (20 mg) by mouth once daily. 90 tablet 1    ezetimibe (Zetia) 10 mg tablet Take 1 tablet (10 mg) by mouth once daily.      lisinopril 20 mg tablet TAKE ONE TABLET (20 MG) BY MOUTH ONCE DAILY IN THE EVENING. 90 tablet 1    lisinopriL-hydrochlorothiazide 10-12.5 mg tablet Take 1 tablet by mouth once daily. 90 tablet 1    meclizine (Antivert) 25 mg tablet Take 1 tablet (25 mg) by mouth 3 times a day as needed for dizziness. 30 tablet 1    metFORMIN XR (Glucophage-XR) 500 mg 24 hr tablet Take 1 tablet (500 mg) by mouth 2 times daily (morning and late afternoon).      metoclopramide (Reglan) 10 mg tablet Take 1 tablet (10 mg) by mouth 3 times a day.      metoprolol tartrate (Lopressor) 25 mg tablet Take 1 tablet (25 mg) by mouth 2 times a day. 180 tablet 1    nitroglycerin (Nitrostat) 0.4 mg SL tablet Place 1 tablet (0.4 mg) under the tongue every 5 minutes if needed for chest pain. 90 tablet 1    OneTouch Ultra Test strip TEST 2 TIMES DAILY.      pantoprazole (ProtoNix) 40 mg EC tablet Take 1 tablet (40 mg) by mouth 2 times a day. Do not crush, chew, or split. 180 tablet 1    rosuvastatin (Crestor) 40 mg tablet Take 1 tablet (40 mg) by mouth once daily. 90 tablet 1     No current facility-administered medications for this visit.       Objective     Vital Signs  /73  HR 78  SpO2 97%  Weight 77.1 kg  Height 1.575 m (5'2\")    Physical Exam  Vital signs as per nursing/MA documentation   General appearance: Alert and in no acute distress  HEENT: Normal Inspection   Neck: Normal Inspection   Respiratory: No respiratory distress Lungs are clear   Cardiovascular: Heart rate normal. No gallop  Back: Normal Inspection   Skin inspection: Warm   Musculoskeletal: No deformities   Neuro: Limited exam. Baseline    Review of Systems   Neurological:  Positive for headaches.     Comprehensive review of systems as allowed by patient condition and nursing input is negative    Scanned " Document on 08/14/2024   Component Date Value Ref Range Status    Hemoglobin A1C External 03/28/2024 5.3  % Final   Orders Only on 07/01/2024   Component Date Value Ref Range Status    [facility] GFR AA 07/01/2024 >60   Final    [facility] Glomerular Filtration R* 07/01/2024 >60  mL/min/1.73m? Final    [facility] BUN 07/01/2024 15  9 - 23 mg/dL Final    [facility] Calculated Osmolality 07/01/2024 285  275 - 295 mOsm/kg Final    [facility] Glucose 07/01/2024 91  74 - 106 mg/dL Final    [facility] BUN/Creat Ratio 07/01/2024 16.7   Final    [facility] CO2, venous 07/01/2024 33.0 (H)  20.0 - 31.0 mmol/L Final    [facility] Calcium 07/01/2024 9.2  8.7 - 10.4 mg/dL Final    [facility] Creatinine 07/01/2024 0.9 (H)  0.5 - 0.8 mg/dL Final    [facility] Chloride 07/01/2024 108 (H)  98 - 107 mmol/L Final    [facility] Na 07/01/2024 143  135 - 145 mmol/L Final    [facility] K 07/01/2024 4.2  3.5 - 5.1 mmol/L Final       Radiology: Reviewed imaging in powerchart.  BI mammo bilateral screening tomosynthesis    Result Date: 4/22/2024  These images are not reportable by radiology and will not be interpreted by  Radiologists.      No family history on file.  Social History     Socioeconomic History    Marital status:    Tobacco Use    Smoking status: Every Day     Current packs/day: 1.00     Types: Cigarettes    Smokeless tobacco: Never   Vaping Use    Vaping status: Never Used   Substance and Sexual Activity    Alcohol use: Yes    Drug use: Never     Past Medical History:   Diagnosis Date    Abnormal findings on diagnostic imaging of other specified body structures 09/06/2019    Abnormal MRI    Personal history of other diseases of the digestive system     History of constipation    Personal history of other specified conditions     History of diarrhea     Past Surgical History:   Procedure Laterality Date    CHOLECYSTECTOMY  08/30/2013    Cholecystectomy    CT HEAD ANGIO W AND WO IV CONTRAST  12/8/2021    CT HEAD ANGIO W  AND WO IV CONTRAST 12/8/2021 [facility]    HYSTERECTOMY  08/30/2013    Hysterectomy    OTHER SURGICAL HISTORY  03/05/2019    Tonsillectomy

## 2024-09-17 LAB
NON-UH HIE A/G RATIO: 1.3
NON-UH HIE ALB: 3.6 G/DL (ref 3.4–5)
NON-UH HIE ALK PHOS: 77 UNIT/L (ref 45–117)
NON-UH HIE BILIRUBIN, TOTAL: 0.6 MG/DL (ref 0.3–1.2)
NON-UH HIE BUN/CREAT RATIO: 18.9
NON-UH HIE BUN: 17 MG/DL (ref 9–23)
NON-UH HIE CALCIUM: 9.2 MG/DL (ref 8.7–10.4)
NON-UH HIE CALCULATED OSMOLALITY: 282 MOSM/KG (ref 275–295)
NON-UH HIE CHLORIDE: 105 MMOL/L (ref 98–107)
NON-UH HIE CO2, VENOUS: 32 MMOL/L (ref 20–31)
NON-UH HIE CREATININE, URINE MG/DL: 26.5 MG/DL
NON-UH HIE CREATININE: 0.9 MG/DL (ref 0.5–0.8)
NON-UH HIE GFR AA: >60
NON-UH HIE GLOBULIN: 2.8 G/DL
NON-UH HIE GLOMERULAR FILTRATION RATE: >60 ML/MIN/1.73M?
NON-UH HIE GLUCOSE: 79 MG/DL (ref 74–106)
NON-UH HIE GOT: 14 UNIT/L (ref 15–37)
NON-UH HIE GPT: 31 UNIT/L (ref 10–49)
NON-UH HIE HGB A1C: 5.5 %
NON-UH HIE K: 3.5 MMOL/L (ref 3.5–5.1)
NON-UH HIE MICROALBUMIN, URINE MG/L: 3 MG/L
NON-UH HIE MICROALBUMIN/CREATININE RATIO: 11 MG MALB/GM CREAT (ref 0–30)
NON-UH HIE NA: 141 MMOL/L (ref 135–145)
NON-UH HIE TOTAL PROTEIN: 6.4 G/DL (ref 5.7–8.2)
NON-UH HIE TSH: 2.3 UIU/ML (ref 0.55–4.78)
NON-UH HIE VIT D 25: 16 NG/ML

## 2024-12-30 ENCOUNTER — TELEPHONE (OUTPATIENT)
Dept: PRIMARY CARE | Facility: CLINIC | Age: 69
End: 2024-12-30
Payer: MEDICARE

## 2025-01-15 ENCOUNTER — APPOINTMENT (OUTPATIENT)
Dept: PRIMARY CARE | Facility: CLINIC | Age: 70
End: 2025-01-15
Payer: MEDICARE

## 2025-01-15 VITALS
HEIGHT: 62 IN | HEART RATE: 78 BPM | DIASTOLIC BLOOD PRESSURE: 100 MMHG | SYSTOLIC BLOOD PRESSURE: 170 MMHG | BODY MASS INDEX: 30.18 KG/M2 | WEIGHT: 164 LBS

## 2025-01-15 DIAGNOSIS — I10 PRIMARY HYPERTENSION: ICD-10-CM

## 2025-01-15 DIAGNOSIS — R42 DIZZINESS: ICD-10-CM

## 2025-01-15 DIAGNOSIS — E13.69 OTHER SPECIFIED DIABETES MELLITUS WITH OTHER SPECIFIED COMPLICATION, UNSPECIFIED WHETHER LONG TERM INSULIN USE (MULTI): ICD-10-CM

## 2025-01-15 DIAGNOSIS — M25.511 CHRONIC RIGHT SHOULDER PAIN: Primary | ICD-10-CM

## 2025-01-15 DIAGNOSIS — I15.9 SECONDARY HYPERTENSION: ICD-10-CM

## 2025-01-15 DIAGNOSIS — M25.511 RIGHT SHOULDER PAIN, UNSPECIFIED CHRONICITY: ICD-10-CM

## 2025-01-15 DIAGNOSIS — C53.9 MALIGNANT NEOPLASM OF CERVIX, UNSPECIFIED SITE (MULTI): ICD-10-CM

## 2025-01-15 DIAGNOSIS — G89.29 CHRONIC RIGHT SHOULDER PAIN: Primary | ICD-10-CM

## 2025-01-15 DIAGNOSIS — Z00.00 ROUTINE GENERAL MEDICAL EXAMINATION AT A HEALTH CARE FACILITY: ICD-10-CM

## 2025-01-15 DIAGNOSIS — R73.02 IGT (IMPAIRED GLUCOSE TOLERANCE): ICD-10-CM

## 2025-01-15 DIAGNOSIS — J44.9 CHRONIC OBSTRUCTIVE PULMONARY DISEASE, UNSPECIFIED COPD TYPE (MULTI): ICD-10-CM

## 2025-01-15 DIAGNOSIS — F32.A DEPRESSION, UNSPECIFIED DEPRESSION TYPE: ICD-10-CM

## 2025-01-15 DIAGNOSIS — T14.8XXA MUSCLE STRAIN: ICD-10-CM

## 2025-01-15 DIAGNOSIS — K21.9 GASTROESOPHAGEAL REFLUX DISEASE, UNSPECIFIED WHETHER ESOPHAGITIS PRESENT: ICD-10-CM

## 2025-01-15 DIAGNOSIS — E78.5 HYPERLIPIDEMIA, UNSPECIFIED HYPERLIPIDEMIA TYPE: ICD-10-CM

## 2025-01-15 DIAGNOSIS — E11.9 TYPE 2 DIABETES MELLITUS WITHOUT COMPLICATION, UNSPECIFIED WHETHER LONG TERM INSULIN USE (MULTI): ICD-10-CM

## 2025-01-15 DIAGNOSIS — I72.9 ANEURYSM (CMS-HCC): ICD-10-CM

## 2025-01-15 PROCEDURE — 3080F DIAST BP >= 90 MM HG: CPT | Performed by: EMERGENCY MEDICINE

## 2025-01-15 PROCEDURE — 3008F BODY MASS INDEX DOCD: CPT | Performed by: EMERGENCY MEDICINE

## 2025-01-15 PROCEDURE — 1159F MED LIST DOCD IN RCRD: CPT | Performed by: EMERGENCY MEDICINE

## 2025-01-15 PROCEDURE — 99213 OFFICE O/P EST LOW 20 MIN: CPT | Performed by: EMERGENCY MEDICINE

## 2025-01-15 PROCEDURE — 99497 ADVNCD CARE PLAN 30 MIN: CPT | Performed by: EMERGENCY MEDICINE

## 2025-01-15 PROCEDURE — 4010F ACE/ARB THERAPY RXD/TAKEN: CPT | Performed by: EMERGENCY MEDICINE

## 2025-01-15 PROCEDURE — 1124F ACP DISCUSS-NO DSCNMKR DOCD: CPT | Performed by: EMERGENCY MEDICINE

## 2025-01-15 PROCEDURE — 3077F SYST BP >= 140 MM HG: CPT | Performed by: EMERGENCY MEDICINE

## 2025-01-15 PROCEDURE — G0439 PPPS, SUBSEQ VISIT: HCPCS | Performed by: EMERGENCY MEDICINE

## 2025-01-15 PROCEDURE — 4004F PT TOBACCO SCREEN RCVD TLK: CPT | Performed by: EMERGENCY MEDICINE

## 2025-01-15 PROCEDURE — 20610 DRAIN/INJ JOINT/BURSA W/O US: CPT | Performed by: EMERGENCY MEDICINE

## 2025-01-15 PROCEDURE — 99397 PER PM REEVAL EST PAT 65+ YR: CPT | Performed by: EMERGENCY MEDICINE

## 2025-01-15 RX ORDER — PANTOPRAZOLE SODIUM 40 MG/1
40 TABLET, DELAYED RELEASE ORAL 2 TIMES DAILY
Qty: 180 TABLET | Refills: 1 | Status: SHIPPED | OUTPATIENT
Start: 2025-01-15

## 2025-01-15 RX ORDER — NITROGLYCERIN 0.4 MG/1
0.4 TABLET SUBLINGUAL EVERY 5 MIN PRN
Qty: 90 TABLET | Refills: 1 | Status: SHIPPED | OUTPATIENT
Start: 2025-01-15

## 2025-01-15 RX ORDER — METOPROLOL TARTRATE 25 MG/1
25 TABLET, FILM COATED ORAL 2 TIMES DAILY
Qty: 180 TABLET | Refills: 1 | Status: SHIPPED | OUTPATIENT
Start: 2025-01-15 | End: 2025-07-14

## 2025-01-15 RX ORDER — MECLIZINE HYDROCHLORIDE 25 MG/1
25 TABLET ORAL 3 TIMES DAILY PRN
Qty: 30 TABLET | Refills: 1 | Status: SHIPPED | OUTPATIENT
Start: 2025-01-15

## 2025-01-15 RX ORDER — BUSPIRONE HYDROCHLORIDE 10 MG/1
10 TABLET ORAL 3 TIMES DAILY
Qty: 90 TABLET | Refills: 3 | Status: SHIPPED | OUTPATIENT
Start: 2025-01-15

## 2025-01-15 RX ORDER — CYCLOBENZAPRINE HCL 10 MG
10 TABLET ORAL NIGHTLY PRN
Qty: 10 TABLET | Refills: 0 | Status: SHIPPED | OUTPATIENT
Start: 2025-01-15 | End: 2025-01-25

## 2025-01-15 RX ORDER — ROSUVASTATIN CALCIUM 40 MG/1
40 TABLET, COATED ORAL DAILY
Qty: 90 TABLET | Refills: 1 | Status: SHIPPED | OUTPATIENT
Start: 2025-01-15

## 2025-01-15 RX ORDER — ESCITALOPRAM OXALATE 20 MG/1
20 TABLET ORAL DAILY
Qty: 90 TABLET | Refills: 1 | Status: SHIPPED | OUTPATIENT
Start: 2025-01-15 | End: 2026-01-15

## 2025-01-15 RX ORDER — SEMAGLUTIDE 1.34 MG/ML
1 INJECTION, SOLUTION SUBCUTANEOUS
COMMUNITY
Start: 2024-12-21 | End: 2025-01-16 | Stop reason: SDUPTHER

## 2025-01-15 RX ADMIN — TRIAMCINOLONE ACETONIDE 80 MG: 40 INJECTION, SUSPENSION INTRA-ARTICULAR; INTRAMUSCULAR at 08:56

## 2025-01-15 ASSESSMENT — ENCOUNTER SYMPTOMS: HEADACHES: 1

## 2025-01-15 NOTE — PROGRESS NOTES
Subjective   Patient ID: Kimberly Bain is a 70 y.o. female who presents for Medicare Annual Wellness Visit Subsequent and right shoulder pain     Assessment/Plan   Problem List Items Addressed This Visit    None  Visit Diagnoses       IGT (impaired glucose tolerance)            follow-up    Right shoulder pain - Patient complains of significant lack of mobility and persistent pain. Under aseptic precautions, 2 ml kenalog was injected intraarticular into right shoulder. We will provide an order for physical therapy.    Plantar fascitis - Patient complains of tingling pain under her feet due to standing at work all day. She was counseled to perform some exercises regularly.     Dry skin - Patient has dry and itchy skin. Counseled to use anti-allergy cream and monitor.     Hypertension - BP was significantly elevated in office today. Continue metoprolol 25 mg BID and lisinopril 20 mg.  Patient was counseled to keep a home log, if persistently elevated we will consider increasing medication.     anxiety and depression- Patient is on lexapro and buspar. No suicidal or homicidal ideation.     Vertigo- meclizine prn      COPD- currently controlled, continue steroid inhaler      Diabetes- follows with endocrinology. Significant progress with ozempic, last A1c 5.3      GERD-pantoprazole     Hyperlipidemia-on Crestor      Source of history: Nurse, Medical personnel, Medical record, Patient.  History limitation: None.    HPI     70 y.o. female here for follow-up visit    Lifted some heavy box and had shoulder pain.  Was seen by orthopedics.  Advised physical therapy which patient declined      Allergies   Allergen Reactions    Penicillins Other    Sulfa (Sulfonamide Antibiotics) Other       Current Outpatient Medications   Medication Sig Dispense Refill    albuterol 90 mcg/actuation inhaler inhale 1 to 2 puffs by mouth every 4 to 6 hours as needed 6.7 g 1    amitriptyline (Elavil) 25 mg tablet Take 1 tablet (25 mg) by  mouth once daily at bedtime. 90 tablet 1    aspirin 325 mg tablet Take by mouth.      budesonide-formoteroL (Symbicort) 160-4.5 mcg/actuation inhaler Inhale 2 puffs 2 times a day.      busPIRone (Buspar) 10 mg tablet Take 1 tablet (10 mg) by mouth 3 times a day. 90 tablet 3    clindamycin (Cleocin) 300 mg capsule TAKE ONE CAPSULE BY MOUTH EVERY 6 HOURS UNTIL GONE      cyclobenzaprine (Flexeril) 10 mg tablet Take 1 tablet (10 mg) by mouth once daily at bedtime.      doxycycline (Vibramycin) 100 mg capsule Take by mouth every 12 hours.      dulaglutide (Trulicity) 0.75 mg/0.5 mL pen injector Inject under the skin.      ezetimibe (Zetia) 10 mg tablet Take 1 tablet (10 mg) by mouth once daily.      lisinopril 20 mg tablet TAKE ONE TABLET (20 MG) BY MOUTH ONCE DAILY IN THE EVENING. 90 tablet 1    lisinopriL-hydrochlorothiazide 10-12.5 mg tablet Take 1 tablet by mouth once daily. 90 tablet 1    meclizine (Antivert) 25 mg tablet Take 1 tablet (25 mg) by mouth 3 times a day as needed for dizziness. 30 tablet 1    metFORMIN XR (Glucophage-XR) 500 mg 24 hr tablet Take 1 tablet (500 mg) by mouth 2 times daily (morning and late afternoon).      metoclopramide (Reglan) 10 mg tablet Take 1 tablet (10 mg) by mouth 3 times a day.      metoprolol tartrate (Lopressor) 25 mg tablet Take 1 tablet (25 mg) by mouth 2 times a day. 180 tablet 1    nitroglycerin (Nitrostat) 0.4 mg SL tablet Place 1 tablet (0.4 mg) under the tongue every 5 minutes if needed for chest pain. 90 tablet 1    OneTouch Ultra Test strip TEST 2 TIMES DAILY.      Ozempic 1 mg/dose (4 mg/3 mL) pen injector Inject 1 mg under the skin every 7 days.      pantoprazole (ProtoNix) 40 mg EC tablet Take 1 tablet (40 mg) by mouth 2 times a day. Do not crush, chew, or split. 180 tablet 1    rosuvastatin (Crestor) 40 mg tablet Take 1 tablet (40 mg) by mouth once daily. 90 tablet 1    escitalopram (Lexapro) 20 mg tablet Take 1 tablet (20 mg) by mouth once daily. 90 tablet 1  "    No current facility-administered medications for this visit.       Objective     Vital Signs  /73  HR 78  SpO2 97%  Weight 77.1 kg  Height 1.575 m (5'2\")    Physical Exam  Vital signs as per nursing/MA documentation   General appearance: Alert and in no acute distress  HEENT: Normal Inspection   Neck: Normal Inspection   Respiratory: No respiratory distress Lungs are clear   Cardiovascular: Heart rate normal. No gallop  Back: Normal Inspection   Skin inspection: Warm   Musculoskeletal: No deformities   Neuro: Limited exam. Baseline    Review of Systems   Neurological:  Positive for headaches.     Comprehensive review of systems as allowed by patient condition and nursing input is negative    Orders Only on 09/17/2024   Component Date Value Ref Range Status    Hemoglobin A1C External 09/17/2024 5.5  % Final   Orders Only on 09/17/2024   Component Date Value Ref Range Status    [facility] HGB A1C 09/17/2024 5.5  % Final    [facility] Microalbumin, Urine mg/L 09/17/2024 3.0  mg/L Final    [facility] Microalbumin/Creatinine* 09/17/2024 11  0 - 30 mg MALB/gm CREAT Final    [facility] Creatinine, Urine mg/dl 09/17/2024 26.5  mg/dL Final    [facility] TSH 09/17/2024 2.30  0.55 - 4.78 uIU/ml Final    [facility] Vit D 25 09/17/2024 16  ng/mL Final    [facility] BUN 09/17/2024 17  9 - 23 mg/dL Final    [facility] GOT 09/17/2024 14 (L)  15 - 37 unit/L Final    [facility] ALB 09/17/2024 3.6  3.4 - 5.0 g/dL Final    [facility] Glucose 09/17/2024 79  74 - 106 mg/dL Final    [facility] GFR AA 09/17/2024 >60   Final    [facility] Bilirubin, Total 09/17/2024 0.60  0.30 - 1.20 mg/dL Final    [facility] Chloride 09/17/2024 105  98 - 107 mmol/L Final    [facility] A/G Ratio 09/17/2024 1.3   Final    [facility] Na 09/17/2024 141  135 - 145 mmol/L Final    [facility] BUN/Creat Ratio 09/17/2024 18.9   Final    [facility] GPT 09/17/2024 31  10 - 49 unit/L Final    [facility] Creatinine 09/17/2024 0.9 (H)  0.5 - 0.8 mg/dL Final    " [facility] Alk Phos 09/17/2024 77  45 - 117 unit/L Final    [facility] Total Protein 09/17/2024 6.4  5.7 - 8.2 g/dL Final    [facility] CO2, venous 09/17/2024 32.0 (H)  20.0 - 31.0 mmol/L Final    [facility] Glomerular Filtration R* 09/17/2024 >60  mL/min/1.73m? Final    [facility] Calculated Osmolality 09/17/2024 282  275 - 295 mOsm/kg Final    [facility] K 09/17/2024 3.5  3.5 - 5.1 mmol/L Final    [facility] Globulin 09/17/2024 2.8  g/dL Final    [facility] Calcium 09/17/2024 9.2  8.7 - 10.4 mg/dL Final       Radiology: Reviewed imaging in powerchart.  BI mammo bilateral screening tomosynthesis    Result Date: 4/22/2024  These images are not reportable by radiology and will not be interpreted by  Radiologists.      No family history on file.  Social History     Socioeconomic History    Marital status:    Tobacco Use    Smoking status: Every Day     Current packs/day: 1.00     Types: Cigarettes    Smokeless tobacco: Never   Vaping Use    Vaping status: Never Used   Substance and Sexual Activity    Alcohol use: Yes    Drug use: Never     Past Medical History:   Diagnosis Date    Abnormal findings on diagnostic imaging of other specified body structures 09/06/2019    Abnormal MRI    Personal history of other diseases of the digestive system     History of constipation    Personal history of other specified conditions     History of diarrhea     Past Surgical History:   Procedure Laterality Date    CHOLECYSTECTOMY  08/30/2013    Cholecystectomy    CT HEAD ANGIO W AND WO IV CONTRAST  12/8/2021    CT HEAD ANGIO W AND WO IV CONTRAST 12/8/2021 [facility]    HYSTERECTOMY  08/30/2013    Hysterectomy    OTHER SURGICAL HISTORY  03/05/2019    Tonsillectomy

## 2025-01-16 ENCOUNTER — TELEPHONE (OUTPATIENT)
Dept: PRIMARY CARE | Facility: CLINIC | Age: 70
End: 2025-01-16
Payer: MEDICARE

## 2025-01-16 DIAGNOSIS — E13.69 OTHER SPECIFIED DIABETES MELLITUS WITH OTHER SPECIFIED COMPLICATION, UNSPECIFIED WHETHER LONG TERM INSULIN USE (MULTI): ICD-10-CM

## 2025-01-16 RX ORDER — TRIAMCINOLONE ACETONIDE 40 MG/ML
80 INJECTION, SUSPENSION INTRA-ARTICULAR; INTRAMUSCULAR ONCE
Status: COMPLETED | OUTPATIENT
Start: 2025-01-15 | End: 2025-01-15

## 2025-01-16 RX ORDER — SEMAGLUTIDE 1.34 MG/ML
1 INJECTION, SOLUTION SUBCUTANEOUS
Qty: 3 ML | Refills: 0 | Status: SHIPPED | OUTPATIENT
Start: 2025-01-16

## 2025-02-05 ENCOUNTER — TELEMEDICINE (OUTPATIENT)
Dept: PRIMARY CARE | Facility: CLINIC | Age: 70
End: 2025-02-05
Payer: MEDICARE

## 2025-02-05 VITALS
DIASTOLIC BLOOD PRESSURE: 84 MMHG | WEIGHT: 165 LBS | HEIGHT: 62 IN | SYSTOLIC BLOOD PRESSURE: 174 MMHG | BODY MASS INDEX: 30.36 KG/M2

## 2025-02-05 DIAGNOSIS — R73.02 IGT (IMPAIRED GLUCOSE TOLERANCE): ICD-10-CM

## 2025-02-05 DIAGNOSIS — I10 PRIMARY HYPERTENSION: Primary | ICD-10-CM

## 2025-02-05 DIAGNOSIS — E13.69 OTHER SPECIFIED DIABETES MELLITUS WITH OTHER SPECIFIED COMPLICATION, UNSPECIFIED WHETHER LONG TERM INSULIN USE (MULTI): ICD-10-CM

## 2025-02-05 DIAGNOSIS — I72.9 ANEURYSM (CMS-HCC): ICD-10-CM

## 2025-02-05 PROCEDURE — 1159F MED LIST DOCD IN RCRD: CPT | Performed by: EMERGENCY MEDICINE

## 2025-02-05 PROCEDURE — 3008F BODY MASS INDEX DOCD: CPT | Performed by: EMERGENCY MEDICINE

## 2025-02-05 PROCEDURE — 3077F SYST BP >= 140 MM HG: CPT | Performed by: EMERGENCY MEDICINE

## 2025-02-05 PROCEDURE — 99213 OFFICE O/P EST LOW 20 MIN: CPT | Performed by: EMERGENCY MEDICINE

## 2025-02-05 PROCEDURE — 4010F ACE/ARB THERAPY RXD/TAKEN: CPT | Performed by: EMERGENCY MEDICINE

## 2025-02-05 PROCEDURE — 3079F DIAST BP 80-89 MM HG: CPT | Performed by: EMERGENCY MEDICINE

## 2025-02-05 PROCEDURE — 4004F PT TOBACCO SCREEN RCVD TLK: CPT | Performed by: EMERGENCY MEDICINE

## 2025-02-05 RX ORDER — LISINOPRIL 20 MG/1
TABLET ORAL
Qty: 60 TABLET | Refills: 2 | Status: SHIPPED | OUTPATIENT
Start: 2025-02-05

## 2025-02-05 ASSESSMENT — ENCOUNTER SYMPTOMS: HEADACHES: 1

## 2025-02-05 NOTE — PROGRESS NOTES
Subjective   Patient ID: Kimberly Bain is a 70 y.o. female who presents for Hypertension and Headache    This visit was completed virtually due to the restrictions of the COVID-19 pandemic. All issues as below were discussed and addressed but no physical exam was performed. If it was felt that the patient should be evaluated in clinic or ER, then they were directed there. The patient verbally consented to visit.    Assessment/Plan   Problem List Items Addressed This Visit    None    Patient presents for virtual follow up visit    Hypertension - BP has been significantly elevated at home. Patient is on metoprolol 25 mg BID and lisinopril 20 mg.  She complains of persistent headache. We will increase metoprolol dose to 50 mg twice daily and lisinopril 20 mg twice daily.    Right shoulder pain - Controlled after steroid injection    Plantar fascitis - Patient complains of tingling pain under her feet due to standing at work all day. She was counseled to perform some exercises regularly.     Dry skin - Patient has dry and itchy skin. Counseled to use anti-allergy cream and monitor.      anxiety and depression- Patient is on lexapro and buspar. No suicidal or homicidal ideation.     Vertigo- meclizine prn      COPD- currently controlled, continue steroid inhaler      Diabetes- follows with endocrinology. Significant progress with ozempic, last A1c 5.3      GERD-pantoprazole     Hyperlipidemia-on Crestor      Source of history: Nurse, Medical personnel, Medical record, Patient.  History limitation: None.    HPI     Patient presents for virtual follow up visit    Lifted some heavy box and had shoulder pain.  Was seen by orthopedics.  Advised physical therapy which patient declined      Allergies   Allergen Reactions    Penicillins Other    Sulfa (Sulfonamide Antibiotics) Other       Current Outpatient Medications   Medication Sig Dispense Refill    albuterol 90 mcg/actuation inhaler inhale 1 to 2 puffs by mouth every 4  to 6 hours as needed 6.7 g 1    amitriptyline (Elavil) 25 mg tablet Take 1 tablet (25 mg) by mouth once daily at bedtime. 90 tablet 1    aspirin 325 mg tablet Take by mouth.      budesonide-formoteroL (Symbicort) 160-4.5 mcg/actuation inhaler Inhale 2 puffs 2 times a day.      busPIRone (Buspar) 10 mg tablet Take 1 tablet (10 mg) by mouth 3 times a day. 90 tablet 3    clindamycin (Cleocin) 300 mg capsule TAKE ONE CAPSULE BY MOUTH EVERY 6 HOURS UNTIL GONE      cyclobenzaprine (Flexeril) 10 mg tablet Take 1 tablet (10 mg) by mouth once daily at bedtime.      doxycycline (Vibramycin) 100 mg capsule Take by mouth every 12 hours.      dulaglutide (Trulicity) 0.75 mg/0.5 mL pen injector Inject under the skin.      escitalopram (Lexapro) 20 mg tablet Take 1 tablet (20 mg) by mouth once daily. 90 tablet 1    ezetimibe (Zetia) 10 mg tablet Take 1 tablet (10 mg) by mouth once daily.      lisinopril 20 mg tablet TAKE ONE TABLET (20 MG) BY MOUTH ONCE DAILY IN THE EVENING. 90 tablet 1    lisinopriL-hydrochlorothiazide 10-12.5 mg tablet Take 1 tablet by mouth once daily. 90 tablet 1    meclizine (Antivert) 25 mg tablet Take 1 tablet (25 mg) by mouth 3 times a day as needed for dizziness. 30 tablet 1    metFORMIN XR (Glucophage-XR) 500 mg 24 hr tablet Take 1 tablet (500 mg) by mouth 2 times daily (morning and late afternoon).      metoclopramide (Reglan) 10 mg tablet Take 1 tablet (10 mg) by mouth 3 times a day.      metoprolol tartrate (Lopressor) 25 mg tablet Take 1 tablet (25 mg) by mouth 2 times a day. 180 tablet 1    nitroglycerin (Nitrostat) 0.4 mg SL tablet Place 1 tablet (0.4 mg) under the tongue every 5 minutes if needed for chest pain. 90 tablet 1    OneTouch Ultra Test strip TEST 2 TIMES DAILY.      pantoprazole (ProtoNix) 40 mg EC tablet Take 1 tablet (40 mg) by mouth 2 times a day. Do not crush, chew, or split. 180 tablet 1    rosuvastatin (Crestor) 40 mg tablet Take 1 tablet (40 mg) by mouth once daily. 90 tablet 1     semaglutide (Ozempic) 1 mg/dose (4 mg/3 mL) pen injector Inject 1 mg under the skin every 7 days. 3 mL 0    cyclobenzaprine (Flexeril) 10 mg tablet Take 1 tablet (10 mg) by mouth as needed at bedtime for muscle spasms (for pain) for up to 10 days. 10 tablet 0     No current facility-administered medications for this visit.       Objective     Physical Exam    Review of Systems   Neurological:  Positive for headaches.     Comprehensive review of systems as allowed by patient condition and nursing input is negative    No visits with results within 4 Month(s) from this visit.   Latest known visit with results is:   Orders Only on 09/17/2024   Component Date Value Ref Range Status    Hemoglobin A1C External 09/17/2024 5.5  % Final       Radiology: Reviewed imaging in powerchart.  BI mammo bilateral screening tomosynthesis    Result Date: 4/22/2024  These images are not reportable by radiology and will not be interpreted by  Radiologists.      No family history on file.  Social History     Socioeconomic History    Marital status:    Tobacco Use    Smoking status: Every Day     Current packs/day: 1.00     Types: Cigarettes    Smokeless tobacco: Never   Vaping Use    Vaping status: Never Used   Substance and Sexual Activity    Alcohol use: Yes    Drug use: Never     Past Medical History:   Diagnosis Date    Abnormal findings on diagnostic imaging of other specified body structures 09/06/2019    Abnormal MRI    Personal history of other diseases of the digestive system     History of constipation    Personal history of other specified conditions     History of diarrhea     Past Surgical History:   Procedure Laterality Date    CHOLECYSTECTOMY  08/30/2013    Cholecystectomy    CT HEAD ANGIO W AND WO IV CONTRAST  12/8/2021    CT HEAD ANGIO W AND WO IV CONTRAST 12/8/2021 [facility]    HYSTERECTOMY  08/30/2013    Hysterectomy    OTHER SURGICAL HISTORY  03/05/2019    Tonsillectomy

## 2025-02-06 ENCOUNTER — TELEPHONE (OUTPATIENT)
Dept: PRIMARY CARE | Facility: CLINIC | Age: 70
End: 2025-02-06
Payer: MEDICARE

## 2025-02-06 NOTE — TELEPHONE ENCOUNTER
Just an fyi    Patient is coming in tomorrow and wants you to know she will need a work excuse to return to work on sunday

## 2025-02-07 ENCOUNTER — APPOINTMENT (OUTPATIENT)
Dept: PRIMARY CARE | Facility: CLINIC | Age: 70
End: 2025-02-07
Payer: MEDICARE

## 2025-02-07 DIAGNOSIS — E13.69 OTHER SPECIFIED DIABETES MELLITUS WITH OTHER SPECIFIED COMPLICATION, UNSPECIFIED WHETHER LONG TERM INSULIN USE (MULTI): ICD-10-CM

## 2025-02-07 NOTE — TELEPHONE ENCOUNTER
Patient cancelled her apt for today because she is feeling better.  Could she please have a work excuse from feb 3 rd and is returning to work on Sunday    She will  letter after 1pm today

## 2025-02-12 ENCOUNTER — TELEMEDICINE (OUTPATIENT)
Dept: PRIMARY CARE | Facility: CLINIC | Age: 70
End: 2025-02-12
Payer: MEDICARE

## 2025-02-12 VITALS
SYSTOLIC BLOOD PRESSURE: 178 MMHG | DIASTOLIC BLOOD PRESSURE: 82 MMHG | HEIGHT: 62 IN | BODY MASS INDEX: 30.36 KG/M2 | WEIGHT: 165 LBS

## 2025-02-12 DIAGNOSIS — I10 PRIMARY HYPERTENSION: Primary | ICD-10-CM

## 2025-02-12 DIAGNOSIS — F41.9 ANXIETY: ICD-10-CM

## 2025-02-12 DIAGNOSIS — F32.A DEPRESSION, UNSPECIFIED DEPRESSION TYPE: ICD-10-CM

## 2025-02-12 PROCEDURE — 99213 OFFICE O/P EST LOW 20 MIN: CPT | Performed by: EMERGENCY MEDICINE

## 2025-02-12 PROCEDURE — 1159F MED LIST DOCD IN RCRD: CPT | Performed by: EMERGENCY MEDICINE

## 2025-02-12 PROCEDURE — 4010F ACE/ARB THERAPY RXD/TAKEN: CPT | Performed by: EMERGENCY MEDICINE

## 2025-02-12 PROCEDURE — 3077F SYST BP >= 140 MM HG: CPT | Performed by: EMERGENCY MEDICINE

## 2025-02-12 PROCEDURE — 3008F BODY MASS INDEX DOCD: CPT | Performed by: EMERGENCY MEDICINE

## 2025-02-12 PROCEDURE — 3079F DIAST BP 80-89 MM HG: CPT | Performed by: EMERGENCY MEDICINE

## 2025-02-12 RX ORDER — AMLODIPINE BESYLATE 5 MG/1
5 TABLET ORAL 2 TIMES DAILY
Qty: 60 TABLET | Refills: 2 | Status: SHIPPED | OUTPATIENT
Start: 2025-02-12 | End: 2025-08-11

## 2025-02-12 ASSESSMENT — ENCOUNTER SYMPTOMS: HEADACHES: 1

## 2025-02-12 NOTE — PROGRESS NOTES
Subjective   Patient ID: Kimberly Bain is a 70 y.o. female who presents for Virtual Visit (Running high )    This visit was completed virtually due to the restrictions of the COVID-19 pandemic. All issues as below were discussed and addressed but no physical exam was performed. If it was felt that the patient should be evaluated in clinic or ER, then they were directed there. The patient verbally consented to visit.    Assessment/Plan   Problem List Items Addressed This Visit    None    Patient presents for virtual follow up visit    Hypertension - BP has been significantly elevated at home. Patient is on metoprolol 50 mg BID and lisinopril 20 mg BID.  She complains of persistent headache. Possibly anxiety related, recommended patient speak to a counselor regarding her mental state. We will prescribe amlodipine to be taken twice daily.    anxiety and depression- Patient is on lexapro and buspar, however anxiety is persistent. We recommend she see a counselor regarding the same.    Right shoulder pain - Controlled after steroid injection    Plantar fascitis - Patient complains of tingling pain under her feet due to standing at work all day. She was counseled to perform some exercises regularly.     Dry skin - Patient has dry and itchy skin. Counseled to use anti-allergy cream and monitor.      Vertigo- meclizine prn      COPD- currently controlled, continue steroid inhaler      Diabetes- follows with endocrinology. Significant progress with ozempic, last A1c 5.3      GERD-pantoprazole     Hyperlipidemia-on Crestor      Source of history: Nurse, Medical personnel, Medical record, Patient.  History limitation: None.    HPI     Patient presents for virtual follow up visit    Lifted some heavy box and had shoulder pain.  Was seen by orthopedics.  Advised physical therapy which patient declined      Allergies   Allergen Reactions    Penicillins Other    Sulfa (Sulfonamide Antibiotics) Other       Current Outpatient  Medications   Medication Sig Dispense Refill    albuterol 90 mcg/actuation inhaler inhale 1 to 2 puffs by mouth every 4 to 6 hours as needed 6.7 g 1    amitriptyline (Elavil) 25 mg tablet Take 1 tablet (25 mg) by mouth once daily at bedtime. 90 tablet 1    aspirin 325 mg tablet Take by mouth.      busPIRone (Buspar) 10 mg tablet Take 1 tablet (10 mg) by mouth 3 times a day. 90 tablet 3    escitalopram (Lexapro) 20 mg tablet Take 1 tablet (20 mg) by mouth once daily. 90 tablet 1    ezetimibe (Zetia) 10 mg tablet Take 1 tablet (10 mg) by mouth once daily.      lisinopril 20 mg tablet TAKE ONE TABLET (20 MG) BY MOUTH  twice daily 60 tablet 2    meclizine (Antivert) 25 mg tablet Take 1 tablet (25 mg) by mouth 3 times a day as needed for dizziness. 30 tablet 1    metFORMIN XR (Glucophage-XR) 500 mg 24 hr tablet Take 1 tablet (500 mg) by mouth 2 times daily (morning and late afternoon).      metoclopramide (Reglan) 10 mg tablet Take 1 tablet (10 mg) by mouth 3 times a day.      metoprolol tartrate (Lopressor) 25 mg tablet Take 1 tablet (25 mg) by mouth 2 times a day. 180 tablet 1    nitroglycerin (Nitrostat) 0.4 mg SL tablet Place 1 tablet (0.4 mg) under the tongue every 5 minutes if needed for chest pain. 90 tablet 1    pantoprazole (ProtoNix) 40 mg EC tablet Take 1 tablet (40 mg) by mouth 2 times a day. Do not crush, chew, or split. 180 tablet 1    rosuvastatin (Crestor) 40 mg tablet Take 1 tablet (40 mg) by mouth once daily. 90 tablet 1    semaglutide (Ozempic) 1 mg/dose (4 mg/3 mL) pen injector Inject 1 mg under the skin every 7 days. 3 mL 0    budesonide-formoteroL (Symbicort) 160-4.5 mcg/actuation inhaler Inhale 2 puffs 2 times a day. (Patient not taking: Reported on 2/12/2025)      cyclobenzaprine (Flexeril) 10 mg tablet Take 1 tablet (10 mg) by mouth once daily at bedtime. (Patient not taking: Reported on 2/12/2025)      cyclobenzaprine (Flexeril) 10 mg tablet Take 1 tablet (10 mg) by mouth as needed at bedtime  for muscle spasms (for pain) for up to 10 days. (Patient not taking: Reported on 2/12/2025) 10 tablet 0    lisinopriL-hydrochlorothiazide 10-12.5 mg tablet Take 1 tablet by mouth once daily. (Patient not taking: Reported on 2/12/2025) 90 tablet 1    OneTouch Ultra Test strip TEST 2 TIMES DAILY.       No current facility-administered medications for this visit.       Objective     Physical Exam  Patient was not physically examined as this visit was completed virtually.     Review of Systems   Neurological:  Positive for headaches.     Comprehensive review of systems as allowed by patient condition and nursing input is negative    No visits with results within 4 Month(s) from this visit.   Latest known visit with results is:   Orders Only on 09/17/2024   Component Date Value Ref Range Status    Hemoglobin A1C External 09/17/2024 5.5  % Final       Radiology: Reviewed imaging in powerchart.  BI mammo bilateral screening tomosynthesis    Result Date: 4/22/2024  These images are not reportable by radiology and will not be interpreted by  Radiologists.      No family history on file.  Social History     Socioeconomic History    Marital status:    Tobacco Use    Smoking status: Every Day     Current packs/day: 1.00     Types: Cigarettes    Smokeless tobacco: Never   Vaping Use    Vaping status: Never Used   Substance and Sexual Activity    Alcohol use: Yes    Drug use: Never     Past Medical History:   Diagnosis Date    Abnormal findings on diagnostic imaging of other specified body structures 09/06/2019    Abnormal MRI    Personal history of other diseases of the digestive system     History of constipation    Personal history of other specified conditions     History of diarrhea     Past Surgical History:   Procedure Laterality Date    CHOLECYSTECTOMY  08/30/2013    Cholecystectomy    CT HEAD ANGIO W AND WO IV CONTRAST  12/8/2021    CT HEAD ANGIO W AND WO IV CONTRAST 12/8/2021 [facility]     HYSTERECTOMY  08/30/2013    Hysterectomy    OTHER SURGICAL HISTORY  03/05/2019    Tonsillectomy

## 2025-03-06 ENCOUNTER — TELEPHONE (OUTPATIENT)
Dept: PRIMARY CARE | Facility: CLINIC | Age: 70
End: 2025-03-06
Payer: MEDICARE

## 2025-03-06 DIAGNOSIS — Z00.00 ROUTINE GENERAL MEDICAL EXAMINATION AT A HEALTH CARE FACILITY: ICD-10-CM

## 2025-03-07 RX ORDER — AMITRIPTYLINE HYDROCHLORIDE 25 MG/1
25 TABLET, FILM COATED ORAL NIGHTLY
Qty: 90 TABLET | Refills: 1 | Status: SHIPPED | OUTPATIENT
Start: 2025-03-07

## 2025-03-19 ENCOUNTER — TELEPHONE (OUTPATIENT)
Dept: PRIMARY CARE | Facility: CLINIC | Age: 70
End: 2025-03-19
Payer: MEDICARE

## 2025-03-19 DIAGNOSIS — K21.9 GASTROESOPHAGEAL REFLUX DISEASE, UNSPECIFIED WHETHER ESOPHAGITIS PRESENT: ICD-10-CM

## 2025-03-20 RX ORDER — PANTOPRAZOLE SODIUM 40 MG/1
40 TABLET, DELAYED RELEASE ORAL 2 TIMES DAILY
Qty: 180 TABLET | Refills: 1 | Status: SHIPPED | OUTPATIENT
Start: 2025-03-20

## 2025-03-28 ENCOUNTER — TELEPHONE (OUTPATIENT)
Dept: PRIMARY CARE | Facility: CLINIC | Age: 70
End: 2025-03-28
Payer: MEDICARE

## 2025-03-28 DIAGNOSIS — M62.838 MUSCLE SPASM: ICD-10-CM

## 2025-03-28 RX ORDER — CYCLOBENZAPRINE HCL 10 MG
10 TABLET ORAL NIGHTLY
Qty: 30 TABLET | Refills: 1 | Status: SHIPPED | OUTPATIENT
Start: 2025-03-28

## 2025-04-16 ENCOUNTER — APPOINTMENT (OUTPATIENT)
Dept: PRIMARY CARE | Facility: CLINIC | Age: 70
End: 2025-04-16
Payer: MEDICARE

## 2025-04-16 VITALS
BODY MASS INDEX: 31.28 KG/M2 | DIASTOLIC BLOOD PRESSURE: 79 MMHG | WEIGHT: 170 LBS | HEIGHT: 62 IN | OXYGEN SATURATION: 97 % | HEART RATE: 69 BPM | SYSTOLIC BLOOD PRESSURE: 145 MMHG

## 2025-04-16 DIAGNOSIS — J44.9 CHRONIC OBSTRUCTIVE PULMONARY DISEASE, UNSPECIFIED COPD TYPE (MULTI): ICD-10-CM

## 2025-04-16 DIAGNOSIS — E13.69 OTHER SPECIFIED DIABETES MELLITUS WITH OTHER SPECIFIED COMPLICATION, UNSPECIFIED WHETHER LONG TERM INSULIN USE (MULTI): ICD-10-CM

## 2025-04-16 DIAGNOSIS — I10 PRIMARY HYPERTENSION: ICD-10-CM

## 2025-04-16 DIAGNOSIS — E78.2 MIXED HYPERLIPIDEMIA: Primary | ICD-10-CM

## 2025-04-16 DIAGNOSIS — R52 PAIN: ICD-10-CM

## 2025-04-16 DIAGNOSIS — M25.511 RIGHT SHOULDER PAIN, UNSPECIFIED CHRONICITY: ICD-10-CM

## 2025-04-16 LAB
NON-UH HIE A/G RATIO: 1
NON-UH HIE ALB: 3.8 G/DL (ref 3.4–5)
NON-UH HIE ALK PHOS: 85 UNIT/L (ref 45–117)
NON-UH HIE BILIRUBIN, TOTAL: 0.4 MG/DL (ref 0.3–1.2)
NON-UH HIE BUN/CREAT RATIO: 20
NON-UH HIE BUN: 20 MG/DL (ref 9–23)
NON-UH HIE CALCIUM: 10.2 MG/DL (ref 8.7–10.4)
NON-UH HIE CALCULATED LDL CHOLESTEROL: 127 MG/DL (ref 60–130)
NON-UH HIE CALCULATED OSMOLALITY: 283 MOSM/KG (ref 275–295)
NON-UH HIE CHLORIDE: 102 MMOL/L (ref 98–107)
NON-UH HIE CHOLESTEROL: 202 MG/DL (ref 100–200)
NON-UH HIE CO2, VENOUS: 31 MMOL/L (ref 20–31)
NON-UH HIE CREATININE: 1 MG/DL (ref 0.5–0.8)
NON-UH HIE GFR AA: >60
NON-UH HIE GLOBULIN: 3.8 G/DL
NON-UH HIE GLOMERULAR FILTRATION RATE: 55 ML/MIN/1.73M?
NON-UH HIE GLUCOSE: 83 MG/DL (ref 74–106)
NON-UH HIE GOT: 15 UNIT/L (ref 15–37)
NON-UH HIE GPT: 8 UNIT/L (ref 10–49)
NON-UH HIE HDL CHOLESTEROL: 42 MG/DL (ref 40–60)
NON-UH HIE HGB A1C: 5.1 %
NON-UH HIE K: 4 MMOL/L (ref 3.5–5.1)
NON-UH HIE NA: 141 MMOL/L (ref 135–145)
NON-UH HIE TOTAL CHOL/HDL CHOL RATIO: 4.8
NON-UH HIE TOTAL PROTEIN: 7.5 G/DL (ref 5.7–8.2)
NON-UH HIE TRIGLYCERIDES: 164 MG/DL (ref 30–150)
NON-UH HIE VIT D 25: 49 NG/ML

## 2025-04-16 PROCEDURE — 4004F PT TOBACCO SCREEN RCVD TLK: CPT | Performed by: EMERGENCY MEDICINE

## 2025-04-16 PROCEDURE — 3008F BODY MASS INDEX DOCD: CPT | Performed by: EMERGENCY MEDICINE

## 2025-04-16 PROCEDURE — 3078F DIAST BP <80 MM HG: CPT | Performed by: EMERGENCY MEDICINE

## 2025-04-16 PROCEDURE — 3077F SYST BP >= 140 MM HG: CPT | Performed by: EMERGENCY MEDICINE

## 2025-04-16 PROCEDURE — 20610 DRAIN/INJ JOINT/BURSA W/O US: CPT | Performed by: EMERGENCY MEDICINE

## 2025-04-16 PROCEDURE — 99213 OFFICE O/P EST LOW 20 MIN: CPT | Performed by: EMERGENCY MEDICINE

## 2025-04-16 PROCEDURE — 1159F MED LIST DOCD IN RCRD: CPT | Performed by: EMERGENCY MEDICINE

## 2025-04-16 PROCEDURE — 4010F ACE/ARB THERAPY RXD/TAKEN: CPT | Performed by: EMERGENCY MEDICINE

## 2025-04-16 RX ORDER — METHYLPREDNISOLONE ACETATE 40 MG/ML
80 INJECTION, SUSPENSION INTRA-ARTICULAR; INTRALESIONAL; INTRAMUSCULAR; SOFT TISSUE ONCE
Status: COMPLETED | OUTPATIENT
Start: 2025-04-16 | End: 2025-04-16

## 2025-04-16 RX ADMIN — METHYLPREDNISOLONE ACETATE 80 MG: 40 INJECTION, SUSPENSION INTRA-ARTICULAR; INTRALESIONAL; INTRAMUSCULAR; SOFT TISSUE at 17:02

## 2025-04-16 ASSESSMENT — PATIENT HEALTH QUESTIONNAIRE - PHQ9
SUM OF ALL RESPONSES TO PHQ9 QUESTIONS 1 AND 2: 0
1. LITTLE INTEREST OR PLEASURE IN DOING THINGS: NOT AT ALL
2. FEELING DOWN, DEPRESSED OR HOPELESS: NOT AT ALL

## 2025-04-16 ASSESSMENT — ENCOUNTER SYMPTOMS: HEADACHES: 1

## 2025-04-16 NOTE — PROGRESS NOTES
Subjective   Patient ID: Kimberly Bain is a 70 y.o. female who presents for mri (Neck and shoulder )    Assessment/Plan   Problem List Items Addressed This Visit       Other specified diabetes mellitus with other specified complication, unspecified whether long term insulin use (Multi)   follow-up    Right shoulder pain - Patient complains of significant lack of mobility and persistent pain. Under aseptic precautions, 2 ml methylprednisolone was injected intraarticular into right shoulder. We will provide an order for physical therapy.    Plantar fascitis - Patient complains of tingling pain under her feet due to standing at work all day. She was counseled to perform some exercises regularly.     Dry skin - Patient has dry and itchy skin. Counseled to use anti-allergy cream and monitor.     Hypertension - BP was significantly elevated in office today. Continue metoprolol 25 mg BID and lisinopril 20 mg.  Patient was counseled to keep a home log, if persistently elevated we will consider increasing medication.     anxiety and depression- Patient is on lexapro and buspar. No suicidal or homicidal ideation.     Vertigo- meclizine prn      COPD- currently controlled, continue steroid inhaler      Diabetes- follows with endocrinology. Significant progress with ozempic, last A1c 5.3      GERD-pantoprazole     Hyperlipidemia-on Crestor      Source of history: Nurse, Medical personnel, Medical record, Patient.  History limitation: None.    HPI     70 y.o. female here for follow-up visit    Lifted some heavy box and had shoulder pain.  Was seen by orthopedics.  Advised physical therapy which patient declined      Allergies   Allergen Reactions    Penicillins Other    Sulfa (Sulfonamide Antibiotics) Other       Current Outpatient Medications   Medication Sig Dispense Refill    albuterol 90 mcg/actuation inhaler inhale 1 to 2 puffs by mouth every 4 to 6 hours as needed 6.7 g 1    amitriptyline (Elavil) 25 mg tablet Take 1  tablet (25 mg) by mouth once daily at bedtime. 90 tablet 1    amLODIPine (Norvasc) 5 mg tablet Take 1 tablet (5 mg) by mouth 2 times a day. 60 tablet 2    aspirin 325 mg tablet Take by mouth.      busPIRone (Buspar) 10 mg tablet Take 1 tablet (10 mg) by mouth 3 times a day. 90 tablet 3    cyclobenzaprine (Flexeril) 10 mg tablet Take 1 tablet (10 mg) by mouth once daily at bedtime. 30 tablet 1    escitalopram (Lexapro) 20 mg tablet Take 1 tablet (20 mg) by mouth once daily. 90 tablet 1    ezetimibe (Zetia) 10 mg tablet Take 1 tablet (10 mg) by mouth once daily.      lisinopril 20 mg tablet TAKE ONE TABLET (20 MG) BY MOUTH  twice daily 60 tablet 2    meclizine (Antivert) 25 mg tablet Take 1 tablet (25 mg) by mouth 3 times a day as needed for dizziness. 30 tablet 1    metFORMIN XR (Glucophage-XR) 500 mg 24 hr tablet Take 1 tablet (500 mg) by mouth 2 times daily (morning and late afternoon).      metoclopramide (Reglan) 10 mg tablet Take 1 tablet (10 mg) by mouth 3 times a day.      metoprolol tartrate (Lopressor) 25 mg tablet Take 1 tablet (25 mg) by mouth 2 times a day. 180 tablet 1    nitroglycerin (Nitrostat) 0.4 mg SL tablet Place 1 tablet (0.4 mg) under the tongue every 5 minutes if needed for chest pain. 90 tablet 1    OneTouch Ultra Test strip TEST 2 TIMES DAILY.      pantoprazole (ProtoNix) 40 mg EC tablet Take 1 tablet (40 mg) by mouth 2 times a day. Do not crush, chew, or split. 180 tablet 1    rosuvastatin (Crestor) 40 mg tablet Take 1 tablet (40 mg) by mouth once daily. 90 tablet 1    semaglutide (Ozempic) 1 mg/dose (4 mg/3 mL) pen injector Inject 1 mg under the skin every 7 days. 3 mL 0    budesonide-formoteroL (Symbicort) 160-4.5 mcg/actuation inhaler Inhale 2 puffs 2 times a day. (Patient not taking: Reported on 4/16/2025)      cyclobenzaprine (Flexeril) 10 mg tablet Take 1 tablet (10 mg) by mouth as needed at bedtime for muscle spasms (for pain) for up to 10 days. (Patient not taking: Reported on  "4/16/2025) 10 tablet 0    lisinopriL-hydrochlorothiazide 10-12.5 mg tablet Take 1 tablet by mouth once daily. (Patient not taking: Reported on 4/16/2025) 90 tablet 1     No current facility-administered medications for this visit.       Objective     Vital Signs  /73  HR 78  SpO2 97%  Weight 77.1 kg  Height 1.575 m (5'2\")    Physical Exam  Vital signs as per nursing/MA documentation   General appearance: Alert and in no acute distress  HEENT: Normal Inspection   Neck: Normal Inspection   Respiratory: No respiratory distress Lungs are clear   Cardiovascular: Heart rate normal. No gallop  Back: Normal Inspection   Skin inspection: Warm   Musculoskeletal: No deformities   Neuro: Limited exam. Baseline    Review of Systems   Neurological:  Positive for headaches.     Comprehensive review of systems as allowed by patient condition and nursing input is negative    No visits with results within 4 Month(s) from this visit.   Latest known visit with results is:   Orders Only on 09/17/2024   Component Date Value Ref Range Status    Hemoglobin A1C External 09/17/2024 5.5  % Final       Radiology: Reviewed imaging in powerchart.  BI mammo bilateral screening tomosynthesis    Result Date: 4/22/2024  These images are not reportable by radiology and will not be interpreted by  Radiologists.      No family history on file.  Social History     Socioeconomic History    Marital status:    Tobacco Use    Smoking status: Every Day     Current packs/day: 1.00     Types: Cigarettes    Smokeless tobacco: Never   Vaping Use    Vaping status: Never Used   Substance and Sexual Activity    Alcohol use: Yes    Drug use: Never     Past Medical History:   Diagnosis Date    Abnormal findings on diagnostic imaging of other specified body structures 09/06/2019    Abnormal MRI    Personal history of other diseases of the digestive system     History of constipation    Personal history of other specified conditions     History of " diarrhea     Past Surgical History:   Procedure Laterality Date    CHOLECYSTECTOMY  08/30/2013    Cholecystectomy    CT HEAD ANGIO W AND WO IV CONTRAST  12/8/2021    CT HEAD ANGIO W AND WO IV CONTRAST 12/8/2021 [facility]    HYSTERECTOMY  08/30/2013    Hysterectomy    OTHER SURGICAL HISTORY  03/05/2019    Tonsillectomy

## 2025-05-07 ENCOUNTER — TELEPHONE (OUTPATIENT)
Dept: PRIMARY CARE | Facility: CLINIC | Age: 70
End: 2025-05-07
Payer: MEDICARE

## 2025-05-25 DIAGNOSIS — I10 PRIMARY HYPERTENSION: ICD-10-CM

## 2025-05-27 RX ORDER — AMLODIPINE BESYLATE 5 MG/1
5 TABLET ORAL 2 TIMES DAILY
Qty: 60 TABLET | Refills: 2 | Status: SHIPPED | OUTPATIENT
Start: 2025-05-27

## 2025-05-27 RX ORDER — LISINOPRIL 20 MG/1
20 TABLET ORAL 2 TIMES DAILY
Qty: 60 TABLET | Refills: 2 | Status: SHIPPED | OUTPATIENT
Start: 2025-05-27

## 2025-06-04 ENCOUNTER — TELEPHONE (OUTPATIENT)
Dept: GASTROENTEROLOGY | Facility: EXTERNAL LOCATION | Age: 70
End: 2025-06-04
Payer: MEDICARE

## 2025-06-05 ENCOUNTER — TELEPHONE (OUTPATIENT)
Dept: PRIMARY CARE | Facility: CLINIC | Age: 70
End: 2025-06-05
Payer: MEDICARE

## 2025-06-05 DIAGNOSIS — T14.8XXA MUSCLE STRAIN: ICD-10-CM

## 2025-06-06 RX ORDER — CYCLOBENZAPRINE HCL 10 MG
10 TABLET ORAL NIGHTLY PRN
Qty: 10 TABLET | Refills: 0 | Status: SHIPPED | OUTPATIENT
Start: 2025-06-06 | End: 2025-06-16

## 2025-07-21 DIAGNOSIS — I10 PRIMARY HYPERTENSION: ICD-10-CM

## 2025-07-21 RX ORDER — LISINOPRIL 20 MG/1
20 TABLET ORAL 2 TIMES DAILY
Qty: 180 TABLET | Refills: 1 | Status: SHIPPED | OUTPATIENT
Start: 2025-07-21

## 2025-07-21 RX ORDER — AMLODIPINE BESYLATE 5 MG/1
5 TABLET ORAL 2 TIMES DAILY
Qty: 180 TABLET | Refills: 1 | Status: SHIPPED | OUTPATIENT
Start: 2025-07-21

## 2025-07-25 DIAGNOSIS — F32.A DEPRESSION, UNSPECIFIED DEPRESSION TYPE: ICD-10-CM

## 2025-07-28 RX ORDER — ESCITALOPRAM OXALATE 20 MG/1
20 TABLET ORAL DAILY
Qty: 90 TABLET | Refills: 1 | Status: SHIPPED | OUTPATIENT
Start: 2025-07-28 | End: 2026-07-28

## 2025-08-19 ENCOUNTER — TELEPHONE (OUTPATIENT)
Dept: PRIMARY CARE | Facility: CLINIC | Age: 70
End: 2025-08-19
Payer: MEDICARE